# Patient Record
Sex: FEMALE | NOT HISPANIC OR LATINO | URBAN - METROPOLITAN AREA
[De-identification: names, ages, dates, MRNs, and addresses within clinical notes are randomized per-mention and may not be internally consistent; named-entity substitution may affect disease eponyms.]

---

## 2017-03-06 ENCOUNTER — IMPORTED ENCOUNTER (OUTPATIENT)
Dept: URBAN - METROPOLITAN AREA CLINIC 1 | Facility: CLINIC | Age: 77
End: 2017-03-06

## 2017-03-06 PROBLEM — H16.143: Noted: 2017-03-06

## 2017-03-06 PROBLEM — Z96.1: Noted: 2017-03-06

## 2017-03-06 PROBLEM — H43.813: Noted: 2017-03-06

## 2017-03-06 PROBLEM — H04.123: Noted: 2017-03-06

## 2017-03-06 PROBLEM — D31.32: Noted: 2017-03-06

## 2017-03-06 PROCEDURE — 92012 INTRM OPH EXAM EST PATIENT: CPT

## 2017-03-06 NOTE — PATIENT DISCUSSION
1.  Choroidal Nevus OS: Appears Benign and stable. Observe for changes. 2. PVD OU - stable RD precautions 3. Pseudophakia OU - (Standard OU) 4. MAIK w/ increased PEK OU- Pt remains symptomatic despite tear use: Inserted Small Parasol Plug RLL LLL today with no complications; Silicone Plug RLL and LLL:  Risks benefits and alternatives to placing plug was discussed with patient. Patient understands and would like to proceed. Consent was signed. Post op instructions were discussed/given to patient and patient was advised to call our office if any problems arose. Increase ATs to QID OU routinely. Return for an appointment in 6 months 27 with Dr. Salde Chadwick.

## 2017-07-12 PROBLEM — Z12.11 ENCOUNTER FOR COLONOSCOPY DUE TO HISTORY OF ADENOMATOUS COLONIC POLYPS: Status: ACTIVE | Noted: 2017-07-12

## 2017-07-12 PROBLEM — R10.32 LEFT LOWER QUADRANT PAIN: Status: ACTIVE | Noted: 2017-07-12

## 2017-07-12 PROBLEM — Z86.010 ENCOUNTER FOR COLONOSCOPY DUE TO HISTORY OF ADENOMATOUS COLONIC POLYPS: Status: ACTIVE | Noted: 2017-07-12

## 2017-09-08 ENCOUNTER — IMPORTED ENCOUNTER (OUTPATIENT)
Dept: URBAN - METROPOLITAN AREA CLINIC 1 | Facility: CLINIC | Age: 77
End: 2017-09-08

## 2017-09-08 PROBLEM — Z96.1: Noted: 2017-09-08

## 2017-09-08 PROBLEM — H43.813: Noted: 2017-09-08

## 2017-09-08 PROBLEM — H16.143: Noted: 2017-09-08

## 2017-09-08 PROBLEM — D31.32: Noted: 2017-09-08

## 2017-09-08 PROBLEM — H04.123: Noted: 2017-09-08

## 2017-09-08 PROCEDURE — 83861 MICROFLUID ANALY TEARS: CPT

## 2017-09-08 PROCEDURE — 92014 COMPRE OPH EXAM EST PT 1/>: CPT

## 2017-09-08 PROCEDURE — 92015 DETERMINE REFRACTIVE STATE: CPT

## 2017-09-08 NOTE — PATIENT DISCUSSION
1.  Choroidal Nevus OS: Appears Benign and stable. Observe for changes. 2. MAIK w/ improved PEK OU- (H/o Plugs LLs OU) Tear Lab Mild OU today. Cont ATs BID OU routinely. 3.  Pseudophakia OU - (Standard OU) 4. PVD OU - RD precautions. Letter to PCP Return for an appointment in 6 mo 10 dfe with Dr. Morteza Braun.

## 2017-09-25 PROBLEM — C44.629 SQUAMOUS CELL CANCER OF SKIN OF LEFT FOREARM: Status: ACTIVE | Noted: 2017-09-25

## 2018-03-16 ENCOUNTER — IMPORTED ENCOUNTER (OUTPATIENT)
Dept: URBAN - METROPOLITAN AREA CLINIC 1 | Facility: CLINIC | Age: 78
End: 2018-03-16

## 2018-03-16 PROBLEM — H04.123: Noted: 2018-03-16

## 2018-03-16 PROBLEM — D31.32: Noted: 2018-03-16

## 2018-03-16 PROBLEM — H16.143: Noted: 2018-03-16

## 2018-03-16 PROCEDURE — 92012 INTRM OPH EXAM EST PATIENT: CPT

## 2018-03-16 NOTE — PATIENT DISCUSSION
1.  Choroidal Nevus OS: Appears Benign and stable. Observe for changes. 2. MAIK w/ PEK OU- (H/o Plugs LLs OU) Switch to PF ATs QID OU Routinely (Sample of PF Refresh Optive given) Consider Restasis w/o improvement. 3.  Pseudophakia OU - (Standard OU) 4. PVD OU - RD precautions. Return for an appointment in 6 mo 30 with Dr. Isaac Nicole.

## 2018-04-17 ENCOUNTER — IMPORTED ENCOUNTER (OUTPATIENT)
Dept: URBAN - METROPOLITAN AREA CLINIC 1 | Facility: CLINIC | Age: 78
End: 2018-04-17

## 2018-04-17 PROBLEM — H11.31: Noted: 2018-04-17

## 2018-04-17 PROCEDURE — 92012 INTRM OPH EXAM EST PATIENT: CPT

## 2018-04-17 NOTE — PATIENT DISCUSSION
1.  Subconjunctival hemorrhage OD --Patient on ASA every other day. Reassurance given. Condition discussed with patient. 2.  Return for an appointment for Return as scheduled with Dr. Caridad Brown.

## 2018-11-27 ENCOUNTER — IMPORTED ENCOUNTER (OUTPATIENT)
Dept: URBAN - METROPOLITAN AREA CLINIC 1 | Facility: CLINIC | Age: 78
End: 2018-11-27

## 2018-11-27 PROBLEM — H43.813: Noted: 2018-11-27

## 2018-11-27 PROBLEM — D31.32: Noted: 2018-11-27

## 2018-11-27 PROBLEM — H16.143: Noted: 2018-11-27

## 2018-11-27 PROBLEM — H04.123: Noted: 2018-11-27

## 2018-11-27 PROBLEM — Z96.1: Noted: 2018-11-27

## 2018-11-27 PROCEDURE — 92014 COMPRE OPH EXAM EST PT 1/>: CPT

## 2018-11-27 PROCEDURE — 92015 DETERMINE REFRACTIVE STATE: CPT

## 2018-11-27 NOTE — PATIENT DISCUSSION
1.  Choroidal Nevus OS: Appears Benign and stable. Observe for changes. 2. MAIK w/ PEK OU- No improvement. The increase of artificial tears OU to QID were recommended routinely. 3.  PVD OU- Old stable 4. Pseudophakia OU- Doing well. 5. Return for an appointment for dfe in 6 months with Dr. John English.

## 2019-05-30 ENCOUNTER — IMPORTED ENCOUNTER (OUTPATIENT)
Dept: URBAN - METROPOLITAN AREA CLINIC 1 | Facility: CLINIC | Age: 79
End: 2019-05-30

## 2019-05-30 PROBLEM — H43.813: Noted: 2019-05-30

## 2019-05-30 PROBLEM — H04.123: Noted: 2019-05-30

## 2019-05-30 PROBLEM — H16.143: Noted: 2019-05-30

## 2019-05-30 PROBLEM — D31.32: Noted: 2019-05-30

## 2019-05-30 PROBLEM — Z96.1: Noted: 2019-05-30

## 2019-05-30 PROCEDURE — 99213 OFFICE O/P EST LOW 20 MIN: CPT

## 2019-05-30 NOTE — PATIENT DISCUSSION
1.  Choroidal Nevus OS: Appears Benign and stable. Observe for changes. 2. MAIK w/ PEK OU- (LL plugs in place OU) Recommend ATs QID OU routinely and AT Gel/Celluvisc QHS OU 3. Pseudophakia OU - Doing Well 4. PVD OU - RD precautions. Return for an appointment in 6 months 27 with Dr. Theresa Jo.

## 2019-07-02 ENCOUNTER — IMPORTED ENCOUNTER (OUTPATIENT)
Dept: URBAN - METROPOLITAN AREA CLINIC 1 | Facility: CLINIC | Age: 79
End: 2019-07-02

## 2019-07-02 PROBLEM — H11.31: Noted: 2019-07-02

## 2019-07-02 PROCEDURE — 92012 INTRM OPH EXAM EST PATIENT: CPT

## 2019-07-02 NOTE — PATIENT DISCUSSION
1.  Subconjunctival hemorrhage OD -- Reassurance given. Condition discussed with patient. 2.  MAIK w/ PEK OU- (LL plugs in place OU) Recommend ATs QID OU routinely and AT Gel/Celluvisc QHS OU 3. Pseudophakia OU - Doing Well  4. H/o Choroidal Nevus OS 5. H/o PVD OUReturn for an appointment for Return as scheduled 30 with Dr. Devin Awad.

## 2019-07-02 NOTE — PATIENT DISCUSSION
MAIK w/ PEK OU- (LL plugs in place OU) Recommend ATs QID OU routinely and AT Gel/Celluvisc QHS OU 3. Pseudophakia OU - Doing Well  4. H/o Choroidal Nevus OS 5.   H/o PVD OU

## 2019-11-25 ENCOUNTER — IMPORTED ENCOUNTER (OUTPATIENT)
Dept: URBAN - METROPOLITAN AREA CLINIC 1 | Facility: CLINIC | Age: 79
End: 2019-11-25

## 2019-11-25 PROBLEM — D31.32: Noted: 2019-11-25

## 2019-11-25 PROBLEM — Z96.1: Noted: 2019-11-25

## 2019-11-25 PROBLEM — H43.813: Noted: 2019-11-25

## 2019-11-25 PROBLEM — H04.123: Noted: 2019-11-25

## 2019-11-25 PROBLEM — H16.143: Noted: 2019-11-25

## 2019-11-25 PROCEDURE — 92014 COMPRE OPH EXAM EST PT 1/>: CPT

## 2019-11-25 NOTE — PATIENT DISCUSSION
1.  Choroidal Nevus OS -- Appears Benign and stable. Observe for changes. 2. MAIK w/ PEK OU- (LL plugs in place OU) Recommend ATs QID OU routinely and AT Gel/Celluvisc QHS OU. 3. PVD OU -- Old Stable. RD precautions. 4.  Pseudophakia OU -- Doing Well. 5.  H/o Subconjunctival hemorrhage OD -- resolved. Patient defers MRx today. Return for an appointment in 1 year for a 30 with Dr. Brisa Lomax.

## 2021-01-19 ENCOUNTER — IMPORTED ENCOUNTER (OUTPATIENT)
Dept: URBAN - METROPOLITAN AREA CLINIC 1 | Facility: CLINIC | Age: 81
End: 2021-01-19

## 2021-01-19 PROBLEM — D31.32: Noted: 2021-01-19

## 2021-01-19 PROBLEM — H16.143: Noted: 2021-01-19

## 2021-01-19 PROBLEM — H43.813: Noted: 2021-01-19

## 2021-01-19 PROBLEM — H04.123: Noted: 2021-01-19

## 2021-01-19 PROBLEM — Z96.1: Noted: 2021-01-19

## 2021-01-19 PROCEDURE — 92014 COMPRE OPH EXAM EST PT 1/>: CPT

## 2021-01-19 NOTE — PATIENT DISCUSSION
1.  Choroidal Nevus OS: Appears Benign and stable. Observe for changes. 2. MAIK w/ PEK OU- (LL plugs in place OU) Recommend ATs QID OU routinely. Consider Restasis if no improvement3. Pseudophakia OU - (Standard OU) 4. PVD OU - RD precautions. Patient deferred Manifest Rx today. Return for an appointment in 1 year 27 with Dr. Brisa Lomax.

## 2022-01-18 ENCOUNTER — IMPORTED ENCOUNTER (OUTPATIENT)
Dept: URBAN - METROPOLITAN AREA CLINIC 1 | Facility: CLINIC | Age: 82
End: 2022-01-18

## 2022-01-18 PROBLEM — D31.32: Noted: 2022-01-18

## 2022-01-18 PROBLEM — H16.143: Noted: 2022-01-18

## 2022-01-18 PROBLEM — H43.813: Noted: 2022-01-18

## 2022-01-18 PROBLEM — H01.001: Noted: 2022-01-18

## 2022-01-18 PROBLEM — H04.123: Noted: 2022-01-18

## 2022-01-18 PROBLEM — H01.021: Noted: 2022-01-18

## 2022-01-18 PROBLEM — Z96.1: Noted: 2022-01-18

## 2022-01-18 PROBLEM — H01.024: Noted: 2022-01-18

## 2022-01-18 PROBLEM — H01.004: Noted: 2022-01-18

## 2022-01-18 PROCEDURE — 99214 OFFICE O/P EST MOD 30 MIN: CPT

## 2022-01-18 NOTE — PATIENT DISCUSSION
1.  Choroidal Nevus OS -- Appears Benign and stable. Observe for changes. 2. MAIK w/ PEK OU (LL Plugs in place OU) -- Recommend PF ATs QID OU & Gel ATs QHS OU. *Stressed complaince with ATs. Consider Restasis without improvement. 3.  Anterior Blepharitis OU -- Daily Hot compresses and lid scrubs were recommended. 4. Pseudophakia OU (Standard OU) -- Doing well. 5.  PVD OU -- Stable. RD precautions. Patient deferred MRx at today's visit. Return for an appointment in 1 year 27 with Dr. Marc Reed.

## 2022-04-08 ASSESSMENT — VISUAL ACUITY
OS_CC: J1+
OD_SC: 20/20
OD_CC: J1
OS_CC: J1+
OS_CC: J1
OS_SC: 20/20
OD_SC: 20/20-1
OD_SC: 20/25
OS_SC: 20/20
OS_SC: 20/25
OS_SC: 20/20
OD_CC: J1
OS_SC: 20/20
OD_SC: 20/20
OS_SC: 20/20
OS_SC: 20/20
OS_SC: J3
OS_SC: 20/20-1
OD_CC: J1+
OD_SC: 20/20-1
OS_CC: J1
OS_SC: 20/15-2
OD_SC: J3
OD_SC: 20/20+2
OD_SC: 20/20
OS_CC: 20/20
OD_SC: 20/20
OD_CC: 20/20-2
OD_CC: J1
OS_SC: 20/20-2

## 2022-04-08 ASSESSMENT — KERATOMETRY
OD_K1POWER_DIOPTERS: 44.50
OS_K2POWER_DIOPTERS: 44.25
OS_AXISANGLE2_DEGREES: 102
OS_AXISANGLE_DEGREES: 012
OD_K2POWER_DIOPTERS: 44.00
OD_AXISANGLE_DEGREES: 174
OD_AXISANGLE2_DEGREES: 084
OS_K1POWER_DIOPTERS: 45.00

## 2022-04-08 ASSESSMENT — TONOMETRY
OD_IOP_MMHG: 13
OD_IOP_MMHG: 13
OS_IOP_MMHG: 12
OD_IOP_MMHG: 13
OS_IOP_MMHG: 13
OS_IOP_MMHG: 13
OS_IOP_MMHG: 11
OD_IOP_MMHG: 12
OS_IOP_MMHG: 13
OD_IOP_MMHG: 13
OS_IOP_MMHG: 12
OD_IOP_MMHG: 12
OS_IOP_MMHG: 12
OS_IOP_MMHG: 12
OD_IOP_MMHG: 12
OD_IOP_MMHG: 12
OD_IOP_MMHG: 11
OS_IOP_MMHG: 13
OD_IOP_MMHG: 11
OS_IOP_MMHG: 13

## 2022-09-14 ENCOUNTER — HOSPITAL ENCOUNTER (OUTPATIENT)
Dept: PHYSICAL THERAPY | Age: 82
Discharge: HOME | End: 2022-09-14
Payer: MEDICARE

## 2022-09-14 PROCEDURE — 97162 PT EVAL MOD COMPLEX 30 MIN: CPT

## 2022-09-14 PROCEDURE — 97140 MANUAL THERAPY 1/> REGIONS: CPT

## 2022-09-14 PROCEDURE — 97110 THERAPEUTIC EXERCISES: CPT

## 2022-09-14 NOTE — PROGRESS NOTES
PT DAILY TREATMENT NOTE     Patient Name: Yoel Juares  Date:2022  : 1940  [x]  Patient  Verified  Payor: [place] MED[place]ARE / Plan: [place] MEDICARE PART A & B / Product Type: Medicare /    In [id number]  Out time:1135  Total Treatment Time (min): 48  Visit #: 1 of 36    Medicare/BCBS Only   Total Timed Codes (min):  25 1:1 Treatment Time:  53       Treatment Area: Right shoulder pain [M25.511]  Cervicalgia [M54.2]    SUBJECTIVE  Pain Level (0-10 scale): 2/10  Any medication changes, allergies to medications, adverse drug reactions, diagnosis change, or new procedure performed?: [x] No    [] Yes (see summary sheet for update)  Subjective functional status/changes:   [] No changes reported  Patient is a pleasant Right handed 78year old female who presents with complaints of Right sided neck and shoulder pain over the past 6 weeks to 2 months without specific mechanism of injury. She admits to history of arthritis, especially in her Right thumb, and due to thumb pain with using her Right hand to open doors, she has recently been carrying groceries/objects more with her Right arm which may contribute to strain. Currently Patient notes pain with reaching and lifting, and is unable to sleep on her Right side. She also has pain turning her neck especially to the left.      OBJECTIVE    Modality rationale: decrease pain and increase tissue extensibility to improve the patients ability to reduce soreness and improve mobility with driving, lifting   Min Type Additional Details    [] Estim:  []Unatt       []IFC  []Premod                        []Other:  []w/ice   []w/heat  Position:  Location:    [] Estim: []Att    []TENS instruct  []NMES                    []Other:  []w/US   []w/ice   []w/heat  Position:  Location:    []  Traction: [] Cervical       []Lumbar                       [] Prone          []Supine                       []Intermittent   []Continuous Lbs:  [] before manual  [] after manual    [] Ultrasound: []Continuous   [] Pulsed                           []1MHz   []3MHz W/cm2:  Location:    []  Iontophoresis with dexamethasone         Location: [] Take home patch   [] In clinic   x []  Ice     []  heat  []  Ice massage  []  Laser   []  Anodyne Position:  Location:    []  Laser with stim  []  Other:  Position:  Location:    []  Vasopneumatic Device    []  Right     []  Left  Pre-treatment girth:  Post-treatment girth:  Measured at (location):  Pressure:       [] lo [] med [] hi   Temperature: [] lo [] med [] hi   [] Skin assessment post-treatment:  []intact []redness- no adverse reaction    []redness - adverse reaction:     Vasopnuematic compression justification:  Per bilateral girth measures taken and listed above the edema is considered significant and having an impact on the patient's strength and gait/posture       28 min [x]Eval                  []Re-Eval       10 min Therapeutic Exercise:  [] See flow sheet :HEP review and performance    Rationale: increase ROM and increase strength to improve the patients ability to perform ADLs    x min Therapeutic Activity:  [x]  See flow sheet :   Rationale: increase ROM, increase strength, and improve coordination  to improve the patients ability to perform reaching and lifting tasks with greater ease     x min Neuromuscular Re-education:  [x]  See flow sheet :   Rationale: increase strength, improve coordination, improve balance, and increase proprioception  to improve the patients ability to improve postural awareness and stability with functional tasks to reduce compensation and strain     15 min Manual Therapy:  supine STM to Right UT/levator scapulae/cervical paraspinals and scalenes, manual Right shoulder stretching all planes   The manual therapy interventions were performed at a separate and distinct time from the therapeutic activities interventions.   Rationale: decrease pain, increase ROM, increase tissue extensibility, and decrease trigger points to improve ease of turning head while driving     With   [] TE   [] TA   [] neuro   [] other: Patient Education: [x] Review HEP    [] Progressed/Changed HEP based on:   [] positioning   [] body mechanics   [] transfers   [] heat/ice application    [] other:      Other Objective/Functional Measures: cervical AROM: flexion 55 deg, extension 39 deg, rotation Right 45 deg (pain) Left 52 deg (pain), side bending Right 18 deg (pain) Left 10 deg (pain)  Shoulder AROM: flexion Right 115 deg Left 142 deg, abduction Right 120 deg Left 133 deg, functional internal rotation behind the back Right to L4 Left to inferior scapular angle  Shoulder strength: flexion/abduction Right 4/5 Left 5/5, internal rotation 5/5 bilaterally, external rotation Right 4-/5 Left 5/5  Tenderness to pressure in the Right UT/levator scapulae/cervical paraspinals     Fall Risk Assessment: Does the patient have a fear of falling? No If yes, what fall risk assessment was performed? Pain Level (0-10 scale) post treatment: 2/10    ASSESSMENT/Changes in Function:  see POC    Patient will continue to benefit from skilled PT services to modify and progress therapeutic interventions, address functional mobility deficits, address ROM deficits, address strength deficits, analyze and address soft tissue restrictions, analyze and cue movement patterns, analyze and modify body mechanics/ergonomics, assess and modify postural abnormalities, and instruct in home and community integration to attain remaining goals.      [x]  See Plan of Care  []  See progress note/recertification  []  See Discharge Summary         Progress towards goals / Updated goals:  See POC    PLAN  []  Upgrade activities as tolerated     [x]  Continue plan of care  []  Update interventions per flow sheet       []  Discharge due to:_  []  Other:_      Melissa Veras, PT 9/14/2022  11:47 AM    Future Appointments   Date Time Provider Graciela Lopez   9/16/2022  8:30 AM Leopoldo Richardson, PT [facility]   9/19/2022  2:00 PM Anola Norse [facility]   9/21/2022 12:15 PM Anola Norse [facility]   9/23/2022 10:45 AM Haylie Marcos PTA [facility]   9/26/2022  3:30 PM Anola Norse [facility]   9/28/2022 12:15 PM Anola Norse [facility]   9/30/2022  9:15 AM Albino Reina, PT [facility]   10/3/2022 10:00 AM Jasonss Celsoer, PT [facility]   10/5/2022 10:00 AM Jasonss Pitcher, PT [facility]   10/10/2022 10:00 AM Jasonss Pitcher, PT [facility]

## 2022-09-14 NOTE — PROGRESS NOTES
[facility] - [facility]  [address]  [phone] fax  Plan of Care/ Statement of Necessity for Physical Therapy Services    Patient name: Yamila Giron Start of Care: 2022   Referral source: Cherry Lucia MD : 1940    Medical Diagnosis: Right shoulder pain [M25.511]  Cervicalgia [M54.2]  Payor: [place] MEDICARE / Plan: [place] MEDICARE PART A & B / Product Type: Medicare /  Onset Date:2022    Treatment Diagnosis: Right sided neck and shoulder pain   Prior Hospitalization: see medical history Provider#: [id number]   Medications: Verified on Patient summary List    Comorbidities: heart disease, osteoporosis, fibromyalgia, hearing impairment, arthritis   Prior Level of Function: Functionally independent, retired, ambulates without AD, lives alone      The Plan of Care and following information is based on the information from the initial evaluation. Assessment/ key information: Patient is a pleasant Right handed 78year old female who presents with complaints of Right sided neck and shoulder pain over the past 6 weeks to 2 months without specific mechanism of injury. She admits to history of arthritis, especially in her Right thumb, and due to thumb pain with using her Right hand to open doors, she has recently been carrying groceries/objects more with her Right arm which may contribute to strain. Currently Patient notes pain with reaching and lifting, and is unable to sleep on her Right side. She also has pain turning her neck especially to the left. She received an injection to her Right shoulder two days ago and states that initially it reduced pain but has already been wearing off. At evaluation she ambulates without AD and demonstrates forward head/rounded shoulder posture.  She presents with the following objective measures:   cervical AROM: flexion 55 deg, extension 39 deg, rotation Right 45 deg (pain) Left 52 deg (pain), side bending Right 18 deg (pain) Left 10 deg (pain)  Shoulder AROM: flexion Right 115 deg Left 142 deg, abduction Right 120 deg Left 133 deg, functional internal rotation behind the back Right to L4 Left to inferior scapular angle  Shoulder strength: flexion/abduction Right 4/5 Left 5/5, internal rotation 5/5 bilaterally, external rotation Right 4-/5 Left 5/5  Tenderness to pressure in the Right UT/levator scapulae/cervical paraspinals            Fall Risk Assessment: Does the patient have a fear of falling? No If yes, what fall risk assessment was performed? Signs and symptoms consistent with mechanical neck and shoulder pain, no current radicular symptoms. Overall Patient is a good rehab candidate based on premorbid status and will benefit from skilled physical therapy in order to address the above deficits.      Evaluation Complexity History HIGH Complexity :3+ comorbidities / personal factors will impact the outcome/ POC ; Examination MEDIUM Complexity : 3 Standardized tests and measures addressing body structure, function, activity limitation and / or participation in recreation  ;Presentation LOW Complexity : Stable, uncomplicated  ;Clinical Decision Making MEDIUM Complexity : FOTO score of 26-74  Overall Complexity Rating: MEDIUM  Problem List: pain affecting function, decrease ROM, decrease strength, edema affecting function, decrease ADL/ functional abilitiies, decrease activity tolerance, decrease flexibility/ joint mobility, and decrease transfer abilities   Treatment Plan may include any combination of the following: Therapeutic exercise, Therapeutic activities, Neuromuscular re-education, Physical agent/modality, Manual therapy, Patient education, and Self Care training  Patient / Family readiness to learn indicated by: asking questions, trying to perform skills, and interest  Persons(s) to be included in education: patient (P)  Barriers to Learning/Limitations: None  Patient Goal (s): stop the pain  Patient Self Reported Health Status: good  Rehabilitation Potential: good    Short Term Goals: To be accomplished in 1 weeks:  Patient will be independent and compliant with HEP in order to progress toward long term goals. Status at last note/certification: issued and reviewed  Long Term Goals: To be accomplished in 10 treatments:  Patient will improve FOTO assessment score to 59 pts in order to indicate improved functional abilities. Status at last note/certification: 45 pts  2. Patient will improve cervical rotation AROM by at least 20 deg bilaterally and side bending by at least 10 deg bilaterally in order to improve ease of turning head while driving and performing daily tasks. Status at last note/certification: rotation Right 45 deg Left 52 deg, side bending Right 18 deg Left 10 deg  3. Patient will improve Right shoulder flexion and abduction AROM by at least 15 deg in sitting in order to improve ease of ADLs. Status at last note/certification: flexion 115 deg, abduction 120 deg  4. Patient will improve Right shoulder strength to 5/5 throughout in order to improve ease of lifting tasks. Status at last note/certification: flexion/abduction 4/5, external rotation 4-/5  5. Patient will report worst Right sided neck and shoulder pain as 5/10 or less in order to improve quality of sleep. Status at last note/certification: 10/29    Frequency / Duration: Patient to be seen 2 to 3 times per week for 24 to 36 treatments. (All LTGs as above will be assessed and updated every 10 visits or 30 days and progressed as needed)    Patient/ Caregiver education and instruction: Diagnosis, prognosis, exercises   [x]  Plan of care has been reviewed with PTA    Certification Period: 9/14/22 to 12/12/22  Román Bautista, PT 9/14/2022 1:25 PM  _____________________________________________________________________  I certify that the above Therapy Services are being furnished while the patient is under my care.  I agree with the treatment plan and certify that this therapy is necessary.     Physician's Signature:____________Date:_________TIME:________  Shadi Sexton MD  ** Signature, Date and Time must be completed for valid certification **    Please sign and return to [facility] - [facility]  [address]  [phone] fax

## 2022-09-16 ENCOUNTER — HOSPITAL ENCOUNTER (OUTPATIENT)
Dept: PHYSICAL THERAPY | Age: 82
Discharge: HOME | End: 2022-09-16
Payer: MEDICARE

## 2022-09-16 PROCEDURE — 97530 THERAPEUTIC ACTIVITIES: CPT

## 2022-09-16 PROCEDURE — 97112 NEUROMUSCULAR REEDUCATION: CPT

## 2022-09-16 PROCEDURE — 97140 MANUAL THERAPY 1/> REGIONS: CPT

## 2022-09-16 NOTE — PROGRESS NOTES
PT DAILY TREATMENT NOTE     Patient Name: Rosana Holley  Date:2022  : 1940  [x]  Patient  Verified  Payor: [place] MEDICARE / Plan: [place] MEDICARE PART A & B / Product Type: Medicare /    In time:833  Out time:919  Total Treatment Time (min): 46   Visit #: 2 of     Medicare/BCBS Only   Total Timed Codes (min):  46 1:1 Treatment Time:  41       Treatment Area: Right shoulder pain [M25.511]  Cervicalgia [M54.2]    SUBJECTIVE  Pain Level (0-10 scale): 0/10  Any medication changes, allergies to medications, adverse drug reactions, diagnosis change, or new procedure performed?: [x] No    [] Yes (see summary sheet for update)  Subjective functional status/changes:   [] No changes reported  I haven't hurt hardly at all the past couple of days.      OBJECTIVE    Modality rationale: decrease pain and increase tissue extensibility to improve the patients ability to improve cervical mobility with driving, reduce soreness after exercises    Min Type Additional Details    [] Estim:  []Unatt       []IFC  []Premod                        []Other:  []w/ice   []w/heat  Position:  Location:    [] Estim: []Att    []TENS instruct  []NMES                    []Other:  []w/US   []w/ice   []w/heat  Position:  Location:    []  Traction: [] Cervical       []Lumbar                       [] Prone          []Supine                       []Intermittent   []Continuous Lbs:  [] before manual  [] after manual    []  Ultrasound: []Continuous   [] Pulsed                           []1MHz   []3MHz W/cm2:  Location:    []  Iontophoresis with dexamethasone         Location: [] Take home patch   [] In clinic   PD []  Ice     []  heat  []  Ice massage  []  Laser   []  Anodyne Position:  Location:    []  Laser with stim  []  Other:  Position:  Location:    []  Vasopneumatic Device    []  Right     []  Left  Pre-treatment girth:  Post-treatment girth:  Measured at (location):  Pressure:       [] lo [] med [] hi   Temperature: [] lo [] med [] hi   [] Skin assessment post-treatment:  []intact []redness- no adverse reaction    []redness - adverse reaction:     Vasopnuematic compression justification:  Per bilateral girth measures taken and listed above the edema is considered significant and having an impact on the patient's strength and gait/posture      10 min Therapeutic Exercise:  [x] See flow sheet :  UT stretch  Levator scap stretch  Doorway stretch   Rationale: increase ROM and increase strength to improve the patients ability to perform ADLs     12 min Therapeutic Activity:  [x]  See flow sheet :  Rows, extensions with band  Full cans  Supine serratus punches    Rationale: increase ROM, increase strength, and improve coordination  to improve the patients ability to perform reaching and lifting tasks with greater ease     10 min Neuromuscular Re-education:  [x]  See flow sheet :  Supine scap stab 1-3  Supine cervical retractions   Rationale: increase strength, improve coordination, improve balance, and increase proprioception  to improve the patients ability to improve postural awareness and stability with functional tasks to reduce compensation and strain      14 min Manual Therapy:  supine STM to Right UT/levator scapulae/cervical paraspinals and scalenes, manual Right shoulder stretching all planes   The manual therapy interventions were performed at a separate and distinct time from the therapeutic activities interventions.   Rationale: decrease pain, increase ROM, increase tissue extensibility, and decrease trigger points to improve ease of turning head while driving       With   [] TE   [] TA   [] neuro   [] other: Patient Education: [x] Review HEP    [] Progressed/Changed HEP based on:   [] positioning   [] body mechanics   [] transfers   [] heat/ice application    [] other:      Other Objective/Functional Measures: initiated treatment per flow sheet  Tactile and verbal cueing throughout rows with band to limit shoulder hiking/Right UT hyperactivation  Good posture with full cans but with some increasing forward head at the end     Pain Level (0-10 scale) post treatment: 4/10 (sore)    ASSESSMENT/Changes in Function: Patient reports good tolerance to manual interventions at evaluation, with limited pain levels in the last couple of days. She does have some increased Right shoulder soreness with strengthening activities today, and has tenderness in the Right cervical and shoulder musculature during manual interventions. Despite soreness after exercises Patient declined modalities. Educated on delayed muscle soreness. Patient will continue to benefit from skilled PT services to modify and progress therapeutic interventions, address functional mobility deficits, address ROM deficits, address strength deficits, analyze and address soft tissue restrictions, analyze and cue movement patterns, analyze and modify body mechanics/ergonomics, assess and modify postural abnormalities, address imbalance/dizziness, and instruct in home and community integration to attain remaining goals. []  See Plan of Care  []  See progress note/recertification  []  See Discharge Summary         Progress towards goals / Updated goals:  Short Term Goals: To be accomplished in 1 weeks:  Patient will be independent and compliant with HEP in order to progress toward long term goals. Status at last note/certification: issued and reviewed  Long Term Goals: To be accomplished in 10 treatments:  Patient will improve FOTO assessment score to 59 pts in order to indicate improved functional abilities. Status at last note/certification: 45 pts  2. Patient will improve cervical rotation AROM by at least 20 deg bilaterally and side bending by at least 10 deg bilaterally in order to improve ease of turning head while driving and performing daily tasks. Status at last note/certification: rotation Right 45 deg Left 52 deg, side bending Right 18 deg Left 10 deg  3.  Patient will improve Right shoulder flexion and abduction AROM by at least 15 deg in sitting in order to improve ease of ADLs. Status at last note/certification: flexion 115 deg, abduction 120 deg  4. Patient will improve Right shoulder strength to 5/5 throughout in order to improve ease of lifting tasks. Status at last note/certification: flexion/abduction 4/5, external rotation 4-/5  5. Patient will report worst Right sided neck and shoulder pain as 5/10 or less in order to improve quality of sleep.   Status at last note/certification: 92/94    PLAN  []  Upgrade activities as tolerated     [x]  Continue plan of care  []  Update interventions per flow sheet       []  Discharge due to:_  []  Other:_      Cheri Fuentes PT 9/16/2022  8:00 AM    Future Appointments   Date Time Provider Graciela Lopez   9/16/2022  8:30 AM Savage Han, PT [facility]   9/19/2022  2:00 PM Amalia Valiente [facility]   9/21/2022 12:15 PM Savage Han, PT [facility]   9/23/2022 10:45 AM Mark Haynes PTA [facility]   9/26/2022  3:30 PM Savage Han, PT [facility]   9/28/2022 12:15 PM Savage Han, PT [facility]   9/30/2022  9:15 AM Savage Han, PT [facility]   10/3/2022 10:00 AM [facility]   10/5/2022 10:00 AM Savage Emely, PT [facility]   10/10/2022 10:00 AM Savage Han, PT [facility]

## 2022-09-19 ENCOUNTER — HOSPITAL ENCOUNTER (OUTPATIENT)
Dept: PHYSICAL THERAPY | Age: 82
Discharge: HOME | End: 2022-09-19
Payer: MEDICARE

## 2022-09-19 PROCEDURE — 97140 MANUAL THERAPY 1/> REGIONS: CPT

## 2022-09-19 PROCEDURE — 97530 THERAPEUTIC ACTIVITIES: CPT

## 2022-09-19 PROCEDURE — 97112 NEUROMUSCULAR REEDUCATION: CPT

## 2022-09-19 NOTE — PROGRESS NOTES
PT DAILY TREATMENT NOTE     Patient Name: Sindi Null  Date:2022  : 1940  [x]  Patient  Verified  Payor: [place] MEDICARE / Plan: [place] MEDICARE PART A & B / Product Type: Medicare /    In time:200  Out time:249  Total Treatment Time (min): 49  Visit #: 3 of     Medicare/BCBS Only   Total Timed Codes (min):  49 1:1 Treatment Time:  49       Treatment Area: Right shoulder pain [M25.511]  Cervicalgia [M54.2]    SUBJECTIVE  Pain Level (0-10 scale): 0/10  Any medication changes, allergies to medications, adverse drug reactions, diagnosis change, or new procedure performed?: [x] No    [] Yes (see summary sheet for update)  Subjective functional status/changes:   [] No changes reported  I'm not having any pain right now, except by my rib, I don't know what I did.      OBJECTIVE    Modality rationale: decrease pain and increase tissue extensibility to improve the patients ability to improve cervical mobility with functional tasks   Min Type Additional Details    [] Estim:  []Unatt       []IFC  []Premod                        []Other:  []w/ice   []w/heat  Position:  Location:    [] Estim: []Att    []TENS instruct  []NMES                    []Other:  []w/US   []w/ice   []w/heat  Position:  Location:    []  Traction: [] Cervical       []Lumbar                       [] Prone          []Supine                       []Intermittent   []Continuous Lbs:  [] before manual  [] after manual    []  Ultrasound: []Continuous   [] Pulsed                           []1MHz   []3MHz W/cm2:  Location:    []  Iontophoresis with dexamethasone         Location: [] Take home patch   [] In clinic   PD []  Ice     []  heat  []  Ice massage  []  Laser   []  Anodyne Position:  Location:    []  Laser with stim  []  Other:  Position:  Location:    []  Vasopneumatic Device    []  Right     []  Left  Pre-treatment girth:  Post-treatment girth:  Measured at (location):  Pressure:       [] lo [] med [] hi   Temperature: [] lo [] med [] hi   [] Skin assessment post-treatment:  []intact []redness- no adverse reaction    []redness - adverse reaction:     Vasopnuematic compression justification:  Per bilateral girth measures taken and listed above the edema is considered significant and having an impact on the patient's strength and gait/posture       10 min Therapeutic Exercise:  [x] See flow sheet :  UT stretch  Levator scap stretch  Doorway stretch   Rationale: increase ROM and increase strength to improve the patients ability to perform ADLs     13 min Therapeutic Activity:  [x]  See flow sheet :  Rows, extensions with band  Full cans  Supine serratus punches   HEP update and review   Rationale: increase ROM, increase strength, and improve coordination  to improve the patients ability to perform reaching and lifting tasks with greater ease     10 min Neuromuscular Re-education:  [x]  See flow sheet :  Supine scap stab 1-3  Supine cervical retractions   Rationale: increase strength, improve coordination, improve balance, and increase proprioception  to improve the patients ability to improve postural awareness and stability with functional tasks to reduce compensation and strain      16 min Manual Therapy:  supine STM to Right UT/levator scapulae/cervical paraspinals and scalenes, manual Right shoulder stretching all planes   The manual therapy interventions were performed at a separate and distinct time from the therapeutic activities interventions.   Rationale: decrease pain, increase ROM, increase tissue extensibility, and decrease trigger points to improve ease of turning head while driving                  With   [] TE   [] TA   [] neuro   [] other: Patient Education: [x] Review HEP    [] Progressed/Changed HEP based on:   [] positioning   [] body mechanics   [] transfers   [] heat/ice application    [] other:      Other Objective/Functional Measures: updated HEP and reviewed  Increased Right side/rib pain with supine serratus punches so held- fine after stopping exercise   Tactile and verbal cueing to reduce bilateral shoulder hiking with rows and wall push ups    Pain Level (0-10 scale) post treatment: 3/10    ASSESSMENT/Changes in Function: Patient reports no adverse effects following first treatment session, and reports overall decreased pain since initiating therapy. She does demonstrate shoulder hiking with exercises intermittently, likely related to/contributing to upper trapezius pain and hyperactivation. Palpable trigger points in the Right upper trapezius that Patient reports is her pain. Progress as able. Patient will continue to benefit from skilled PT services to modify and progress therapeutic interventions, address functional mobility deficits, address ROM deficits, address strength deficits, analyze and address soft tissue restrictions, analyze and cue movement patterns, analyze and modify body mechanics/ergonomics, assess and modify postural abnormalities, address imbalance/dizziness, and instruct in home and community integration to attain remaining goals. []  See Plan of Care  []  See progress note/recertification  []  See Discharge Summary         Progress towards goals / Updated goals:  Short Term Goals: To be accomplished in 1 weeks:  Patient will be independent and compliant with HEP in order to progress toward long term goals. Status at last note/certification: issued and reviewed  Current status: met 9/19/22  Long Term Goals: To be accomplished in 10 treatments:  Patient will improve FOTO assessment score to 59 pts in order to indicate improved functional abilities. Status at last note/certification: 45 pts  Current status:   2. Patient will improve cervical rotation AROM by at least 20 deg bilaterally and side bending by at least 10 deg bilaterally in order to improve ease of turning head while driving and performing daily tasks.    Status at last note/certification: rotation Right 45 deg Left 52 deg, side bending Right 18 deg Left 10 deg  Current status:   3. Patient will improve Right shoulder flexion and abduction AROM by at least 15 deg in sitting in order to improve ease of ADLs. Status at last note/certification: flexion 115 deg, abduction 120 deg  Current status:   4. Patient will improve Right shoulder strength to 5/5 throughout in order to improve ease of lifting tasks. Status at last note/certification: flexion/abduction 4/5, external rotation 4-/5  Current status:   5. Patient will report worst Right sided neck and shoulder pain as 5/10 or less in order to improve quality of sleep.   Status at last note/certification: 02/73  Current status: progressing, decreased pain levels since initiating therapy 9/19/22    PLAN  [x]  Upgrade activities as tolerated     [x]  Continue plan of care  []  Update interventions per flow sheet       []  Discharge due to:_  []  Other:_      Jennifer Haji, PT 9/19/2022  1:59 PM    Future Appointments   Date Time Provider Graciela Lopez   9/19/2022  2:00 PM [place] Covert, PT [facility]   9/21/2022 12:15 PM Arvind Covert, PT [facility]   9/23/2022 10:45 AM Randy Kumari, PTA [facility]   9/26/2022  3:30 PM [place] Covert, PT [facility]   9/28/2022 12:15 PM Arvind Covert, PT [facility]   9/30/2022  9:15 AM Arvind Covert, PT [facility]   10/3/2022 10:00 AM [place] Covert, PT [facility]   10/5/2022 10:00 AM Arvind Covert, PT [facility]   10/10/2022 10:00 AM [place] Covert, PT [facility]

## 2022-09-21 ENCOUNTER — HOSPITAL ENCOUNTER (OUTPATIENT)
Dept: PHYSICAL THERAPY | Age: 82
Discharge: HOME | End: 2022-09-21
Payer: MEDICARE

## 2022-09-21 PROCEDURE — 97140 MANUAL THERAPY 1/> REGIONS: CPT

## 2022-09-21 PROCEDURE — 97112 NEUROMUSCULAR REEDUCATION: CPT

## 2022-09-21 PROCEDURE — 97530 THERAPEUTIC ACTIVITIES: CPT

## 2022-09-21 NOTE — PROGRESS NOTES
PT DAILY TREATMENT NOTE     Patient Name: Beatriz Query  Date:2022  : 1940  [x]  Patient  Verified  Payor: [place] MEDICARE / Plan: [place] MEDICARE PART A & B / Product Type: Medicare /    In time:1215  Out time:107  Total Treatment Time (min): 52   Visit #: 4 of     Medicare/BCBS Only   Total Timed Codes (min):  52 1:1 Treatment Time:  42       Treatment Area: Right shoulder pain [M25.511]  Cervicalgia [M54.2]    SUBJECTIVE  Pain Level (0-10 scale): 0/10  Any medication changes, allergies to medications, adverse drug reactions, diagnosis change, or new procedure performed?: [x] No    [] Yes (see summary sheet for update)  Subjective functional status/changes:   [] No changes reported  I'm just stiff today. And I think I overdid it yesterday, i'm just not feeling very energetic.      OBJECTIVE    Modality rationale: decrease pain and increase tissue extensibility to improve the patients ability to reduce soreness after exercises    Min Type Additional Details    [] Estim:  []Unatt       []IFC  []Premod                        []Other:  []w/ice   []w/heat  Position:  Location:    [] Estim: []Att    []TENS instruct  []NMES                    []Other:  []w/US   []w/ice   []w/heat  Position:  Location:    []  Traction: [] Cervical       []Lumbar                       [] Prone          []Supine                       []Intermittent   []Continuous Lbs:  [] before manual  [] after manual    []  Ultrasound: []Continuous   [] Pulsed                           []1MHz   []3MHz W/cm2:  Location:    []  Iontophoresis with dexamethasone         Location: [] Take home patch   [] In clinic   PD []  Ice     []  heat  []  Ice massage  []  Laser   []  Anodyne Position:  Location:    []  Laser with stim  []  Other:  Position:  Location:    []  Vasopneumatic Device    []  Right     []  Left  Pre-treatment girth:  Post-treatment girth:  Measured at (location):  Pressure:       [] lo [] med [] hi   Temperature: [] lo [] med [] hi   [] Skin assessment post-treatment:  []intact []redness- no adverse reaction    []redness - adverse reaction:     Vasopnuematic compression justification:  Per bilateral girth measures taken and listed above the edema is considered significant and having an impact on the patient's strength and gait/posture       10 min Therapeutic Exercise:  [x] See flow sheet :  UT stretch  Levator scap stretch  Doorway stretch   Rationale: increase ROM and increase strength to improve the patients ability to perform ADLs     17 min Therapeutic Activity:  [x]  See flow sheet :  Rows, extensions with band  Full cans  Supine serratus punches   HEP update and review  Wall slide   Rationale: increase ROM, increase strength, and improve coordination  to improve the patients ability to perform reaching and lifting tasks with greater ease     10 min Neuromuscular Re-education:  [x]  See flow sheet :  Supine scap stab 1-3  Supine cervical retractions with assistance    Rationale: increase strength, improve coordination, improve balance, and increase proprioception  to improve the patients ability to improve postural awareness and stability with functional tasks to reduce compensation and strain      15 min Manual Therapy:  supine STM to Right UT/levator scapulae/cervical paraspinals and scalenes, manual Right shoulder stretching all planes, gentle cervical distraction    The manual therapy interventions were performed at a separate and distinct time from the therapeutic activities interventions.   Rationale: decrease pain, increase ROM, increase tissue extensibility, and decrease trigger points to improve ease of turning head while driving                    With   [] TE   [] TA   [] neuro   [] other: Patient Education: [x] Review HEP    [] Progressed/Changed HEP based on:   [] positioning   [] body mechanics   [] transfers   [] heat/ice application    [] other:      Other Objective/Functional Measures: added wall slides (no lift), with focus on scapular depression with sliding down to neutral   verbal and tactile cues to prevent shoulder hiking with wall push ups, new wall slides, and with supine serratus punches on the Right   Cervical AROM (after manual): rotation Right 54 deg Left 45 deg, side bending Right 16 deg Left 20 deg  Right shoulder AROM: flexion and abduction 145 deg, some soreness     Pain Level (0-10 scale) post treatment: 2/10    ASSESSMENT/Changes in Function: Patient reports ability to sleep on her Right side at night without increased pain for the first time in several weeks, starting last night. Today she demonstrates improving cervical and Right shoulder AROM, but reports that she does still have soreness with mobility. She continues to notice increased Right sided pain with reaching and lifting as well. Patient will continue to benefit from skilled PT services to modify and progress therapeutic interventions, address functional mobility deficits, address ROM deficits, address strength deficits, analyze and address soft tissue restrictions, analyze and cue movement patterns, analyze and modify body mechanics/ergonomics, assess and modify postural abnormalities, address imbalance/dizziness, and instruct in home and community integration to attain remaining goals. []  See Plan of Care  []  See progress note/recertification  []  See Discharge Summary         Progress towards goals / Updated goals:  Short Term Goals: To be accomplished in 1 weeks:  Patient will be independent and compliant with HEP in order to progress toward long term goals. Status at last note/certification: issued and reviewed  Current status: met 9/19/22  Long Term Goals: To be accomplished in 10 treatments:  Patient will improve FOTO assessment score to 59 pts in order to indicate improved functional abilities. Status at last note/certification: 45 pts  Current status:   2.  Patient will improve cervical rotation AROM by at least 20 deg bilaterally and side bending by at least 10 deg bilaterally in order to improve ease of turning head while driving and performing daily tasks. Status at last note/certification: rotation Right 45 deg Left 52 deg, side bending Right 18 deg Left 10 deg  Current status: progressing, rotation Right 54 deg Left 45 deg, side bending Right 16 deg Left 20 deg 9/21/22  3. Patient will improve Right shoulder flexion and abduction AROM by at least 15 deg in sitting in order to improve ease of ADLs. Status at last note/certification: flexion 115 deg, abduction 120 deg  Current status: met, flexion and abduction 145 deg, some soreness 9/21/22  4. Patient will improve Right shoulder strength to 5/5 throughout in order to improve ease of lifting tasks. Status at last note/certification: flexion/abduction 4/5, external rotation 4-/5  Current status:   5. Patient will report worst Right sided neck and shoulder pain as 5/10 or less in order to improve quality of sleep.   Status at last note/certification: 59/66  Current status: progressing, decreased pain levels since initiating therapy 9/19/22    PLAN  [x]  Upgrade activities as tolerated     [x]  Continue plan of care  []  Update interventions per flow sheet       []  Discharge due to:_  []  Other:_      Delon Conti PT 9/21/2022  10:35 AM    Future Appointments   Date Time Provider Graciela Lopez   9/21/2022 12:15 PM Pura Dailey PT [facility]   9/23/2022 10:45 AM Sunita Fox PTA [facility]   9/26/2022  3:30 PM Pura Dailey PT [facility]   9/28/2022 12:15 PM Pura Dailey PT [facility]   9/30/2022  9:15 AM Pura Dailey PT [facility]   10/3/2022 10:00 AM Pura Dailey PT [facility]   10/5/2022 10:00 AM Pura Dailey PT [facility]   10/10/2022 10:00 [facility]

## 2022-09-23 ENCOUNTER — HOSPITAL ENCOUNTER (OUTPATIENT)
Dept: PHYSICAL THERAPY | Age: 82
Discharge: HOME | End: 2022-09-23
Payer: MEDICARE

## 2022-09-23 PROCEDURE — 97112 NEUROMUSCULAR REEDUCATION: CPT

## 2022-09-23 PROCEDURE — 97140 MANUAL THERAPY 1/> REGIONS: CPT

## 2022-09-23 PROCEDURE — 97530 THERAPEUTIC ACTIVITIES: CPT

## 2022-09-23 NOTE — PROGRESS NOTES
PT DAILY TREATMENT NOTE     Patient Name: Enrrique Cr  Date:2022  : 1940  [x]  Patient  Verified  Payor: [place] MED[place]ARE / Plan: [place] MEDICARE PART A & B / Product Type: Medicare /    In time:10:50  Out time:11:44  Total Treatment Time (min): 54  Visit #: 5 of 24-36    Medicare/BCBS Only   Total Timed Codes (min):  44 1:1 Treatment Time: 39        Treatment Area: Right shoulder pain [M25.511]  Cervicalgia [M54.2]    SUBJECTIVE  Pain Level (0-10 scale): 1-2/10  Any medication changes, allergies to medications, adverse drug reactions, diagnosis change, or new procedure performed?: [x] No    [] Yes (see summary sheet for update)  Subjective functional status/changes:   [] No changes reported  I have this tightness to the (R) side of my neck and up to the back of my head to     OBJECTIVE    Modality rationale: decrease pain and increase tissue extensibility to improve the patients ability to reduce soreness after exercises    Min Type Additional Details    [] Estim:  []Unatt       []IFC  []Premod                        []Other:  []w/ice   []w/heat  Position:  Location:    [] Estim: []Att    []TENS instruct  []NMES                    []Other:  []w/US   []w/ice   []w/heat  Position:  Location:    []  Traction: [] Cervical       []Lumbar                       [] Prone          []Supine                       []Intermittent   []Continuous Lbs:  [] before manual  [] after manual    []  Ultrasound: []Continuous   [] Pulsed                           []1MHz   []3MHz W/cm2:  Location:    []  Iontophoresis with dexamethasone         Location: [] Take home patch   [] In clinic   10 []  Ice     [x]  heat  []  Ice massage  []  Laser   []  Anodyne Position: long sitting   Location: cervical spine     []  Laser with stim  []  Other:  Position:  Location:    []  Vasopneumatic Device    []  Right     []  Left  Pre-treatment girth:  Post-treatment girth:  Measured at (location):  Pressure:       [] lo [] med [] hi Temperature: [] lo [] med [] hi   [] Skin assessment post-treatment:  []intact []redness- no adverse reaction    []redness - adverse reaction:     Vasopnuematic compression justification:  Per bilateral girth measures taken and listed above the edema is considered significant and having an impact on the patient's strength and gait/posture       5 min Therapeutic Exercise:  [x] See flow sheet :  UT stretch  Levator scap stretch  Doorway stretch   Rationale: increase ROM and increase strength to improve the patients ability to perform ADLs     14 min Therapeutic Activity:  [x]  See flow sheet :  Rows, extensions with band  Full cans  Supine serratus punches   HEP update and review  Wall slide   Rationale: increase ROM, increase strength, and improve coordination  to improve the patients ability to perform reaching and lifting tasks with greater ease     10 min Neuromuscular Re-education:  [x]  See flow sheet :  Supine scap stab 1-3  Supine cervical retractions with assistance    Rationale: increase strength, improve coordination, improve balance, and increase proprioception  to improve the patients ability to improve postural awareness and stability with functional tasks to reduce compensation and strain      15 min Manual Therapy:  supine STM to Right UT/levator scapulae/cervical paraspinals and scalenes, manual Right shoulder stretching all planes, gentle cervical distraction    The manual therapy interventions were performed at a separate and distinct time from the therapeutic activities interventions.   Rationale: decrease pain, increase ROM, increase tissue extensibility, and decrease trigger points to improve ease of turning head while driving                    With   [] TE   [] TA   [] neuro   [] other: Patient Education: [x] Review HEP    [] Progressed/Changed HEP based on:   [] positioning   [] body mechanics   [] transfers   [] heat/ice application    [] other:      Other Objective/Functional Measures  LTG#  5. Patient will report worst Right sided neck and shoulder pain as 5/10 or less in order to improve quality of sleep. Status at last note/certification: 06/36  Current status: progressing arrive with pain 1-2/10 more stiffness then pain 9/23/2022    Pain Level (0-10 scale) post treatment: 1/10    ASSESSMENT/Changes in Function:   Patient experience some increase pain/discomfort to the (R) lower ribs with wall slides, wall push up and serratus punch in supine - possible due to attempting to compensate with lower trap, lats instead of using the serratus and mid trap/rhomboids due to weakness in these area. Gave tactile cuing to attempt to decrease compensate and to elicit a proper muscle contraction to the mid trap/serratus and rhomboids. Patient presented with a painful and large trigger point to the (R) UT and LS that responded well with manual     Patient will continue to benefit from skilled PT services to modify and progress therapeutic interventions, address functional mobility deficits, address ROM deficits, address strength deficits, analyze and address soft tissue restrictions, analyze and cue movement patterns, analyze and modify body mechanics/ergonomics, assess and modify postural abnormalities, address imbalance/dizziness, and instruct in home and community integration to attain remaining goals. [x]  See Plan of Care  []  See progress note/recertification  []  See Discharge Summary         Progress towards goals / Updated goals:  Short Term Goals: To be accomplished in 1 weeks:  Patient will be independent and compliant with HEP in order to progress toward long term goals. Status at last note/certification: issued and reviewed  Current status: met 9/19/22  Long Term Goals: To be accomplished in 10 treatments:  Patient will improve FOTO assessment score to 59 pts in order to indicate improved functional abilities. Status at last note/certification: 45 pts  Current status:   2. Patient will improve cervical rotation AROM by at least 20 deg bilaterally and side bending by at least 10 deg bilaterally in order to improve ease of turning head while driving and performing daily tasks. Status at last note/certification: rotation Right 45 deg Left 52 deg, side bending Right 18 deg Left 10 deg  Current status: progressing, rotation Right 54 deg Left 45 deg, side bending Right 16 deg Left 20 deg 9/21/22  3. Patient will improve Right shoulder flexion and abduction AROM by at least 15 deg in sitting in order to improve ease of ADLs. Status at last note/certification: flexion 115 deg, abduction 120 deg  Current status: met, flexion and abduction 145 deg, some soreness 9/21/22  4. Patient will improve Right shoulder strength to 5/5 throughout in order to improve ease of lifting tasks. Status at last note/certification: flexion/abduction 4/5, external rotation 4-/5  Current status:   5. Patient will report worst Right sided neck and shoulder pain as 5/10 or less in order to improve quality of sleep.   Status at last note/certification: 56/98  Current status: progressing arrive with pain 1-2/10 more stiffness then pain 9/23/2022    PLAN  [x]  Upgrade activities as tolerated     [x]  Continue plan of care  []  Update interventions per flow sheet       []  Discharge due to:_  []  Other:_      Kymberly Brandt, MARC 9/23/2022  10:35 AM    Future Appointments   Date Time Provider Graciela Lopez   9/26/2022  3:30 PM Jordyn Javier PT [facility]   9/28/2022 12:15 PM Jordyn Javier PT [facility]   9/30/2022  9:15 AM Jordyn Javier PT [facility]   10/3/2022 10:00 AM Jordyn Javier PT [facility]   10/5/2022 10:00 AM Jordyn Javier PT [facility]   10/10/2022 10:00 AM Jordyn Javier PT [facility]

## 2022-09-26 ENCOUNTER — HOSPITAL ENCOUNTER (OUTPATIENT)
Dept: PHYSICAL THERAPY | Age: 82
Discharge: HOME | End: 2022-09-26
Payer: MEDICARE

## 2022-09-26 PROCEDURE — 97140 MANUAL THERAPY 1/> REGIONS: CPT

## 2022-09-26 PROCEDURE — 97112 NEUROMUSCULAR REEDUCATION: CPT

## 2022-09-26 PROCEDURE — 97530 THERAPEUTIC ACTIVITIES: CPT

## 2022-09-26 NOTE — PROGRESS NOTES
PT DAILY TREATMENT NOTE     Patient Name: Margot Aus  Date:2022  : 1940  [x]  Patient  Verified  Payor: Everett Vallecillo / Plan: [place] MEDICARE PART A & B / Product Type: Medicare /    In time:330  Out time:419  Total Treatment Time (min): 49  Visit #: 6 of     Medicare/BCBS Only   Total Timed Codes (min):  49 1:1 Treatment Time:  49       Treatment Area: Right shoulder pain [M25.511]  Cervicalgia [M54.2]    SUBJECTIVE  Pain Level (0-10 scale): 3/10  Any medication changes, allergies to medications, adverse drug reactions, diagnosis change, or new procedure performed?: [x] No    [] Yes (see summary sheet for update)  Subjective functional status/changes:   [] No changes reported  I'm aching all over today, maybe it's the weather.      OBJECTIVE    Modality rationale: decrease pain and increase tissue extensibility to improve the patients ability to reduce soreness after exercises    Min Type Additional Details    [] Estim:  []Unatt       []IFC  []Premod                        []Other:  []w/ice   []w/heat  Position:  Location:    [] Estim: []Att    []TENS instruct  []NMES                    []Other:  []w/US   []w/ice   []w/heat  Position:  Location:    []  Traction: [] Cervical       []Lumbar                       [] Prone          []Supine                       []Intermittent   []Continuous Lbs:  [] before manual  [] after manual    []  Ultrasound: []Continuous   [] Pulsed                           []1MHz   []3MHz W/cm2:  Location:    []  Iontophoresis with dexamethasone         Location: [] Take home patch   [] In clinic   PD []  Ice     []  heat  []  Ice massage  []  Laser   []  Anodyne Position:  Location:    []  Laser with stim  []  Other:  Position:  Location:    []  Vasopneumatic Device    []  Right     []  Left  Pre-treatment girth:  Post-treatment girth:  Measured at (location):  Pressure:       [] lo [] med [] hi   Temperature: [] lo [] med [] hi   [] Skin assessment post-treatment: []intact []redness- no adverse reaction    []redness - adverse reaction:     Vasopnuematic compression justification:  Per bilateral girth measures taken and listed above the edema is considered significant and having an impact on the patient's strength and gait/posture       10 min Therapeutic Exercise:  [x] See flow sheet :  UT stretch  Levator scap stretch  Doorway stretch  Ball on wall    Rationale: increase ROM and increase strength to improve the patients ability to perform ADLs     16 min Therapeutic Activity:  [x]  See flow sheet :  Rows, extensions with band  Full cans  Supine serratus punches   Wall slide   Rationale: increase ROM, increase strength, and improve coordination  to improve the patients ability to perform reaching and lifting tasks with greater ease     10 min Neuromuscular Re-education:  [x]  See flow sheet :  Supine scap stab 1-3  Supine cervical retractions with assistance    Rationale: increase strength, improve coordination, improve balance, and increase proprioception  to improve the patients ability to improve postural awareness and stability with functional tasks to reduce compensation and strain      13 min Manual Therapy:  seated STM to Right UT/levator scapulae    The manual therapy interventions were performed at a separate and distinct time from the therapeutic activities interventions.   Rationale: decrease pain, increase ROM, increase tissue extensibility, and decrease trigger points to improve ease of turning head while driving                               With   [] TE   [] TA   [] neuro   [] other: Patient Education: [x] Review HEP    [] Progressed/Changed HEP based on:   [] positioning   [] body mechanics   [] transfers   [] heat/ice application    [] other:      Other Objective/Functional Measures: added ball circles on wall for scapular endurance/engagement- challenged with sequencing and reported increased Right thumb pain  Manual assistance for cervical retractions to ensure proper technique- good technique noted  Increased lateral rib discomfort bilaterally with supine serratus punches on the Right again likely due to weakness/compensation    Updated HEP, included orange resistance band     Pain Level (0-10 scale) post treatment: 1/10    ASSESSMENT/Changes in Function: Patient reports that she has had increased aching throughout her body since yesterday, possibly due to recent cool down in the weather. At start of visit she notes pain in both UT areas, but reports more soreness in the Right shoulder with exercises. Performed manual interventions in seated to better address scapular restrictions and tightness, with Patient reporting good reduction in discomfort following manual. Updated HEP and issued orange resistance band. Patient will continue to benefit from skilled PT services to modify and progress therapeutic interventions, address functional mobility deficits, address ROM deficits, address strength deficits, analyze and address soft tissue restrictions, analyze and cue movement patterns, analyze and modify body mechanics/ergonomics, assess and modify postural abnormalities, address imbalance/dizziness, and instruct in home and community integration to attain remaining goals. []  See Plan of Care  []  See progress note/recertification  []  See Discharge Summary         Progress towards goals / Updated goals:  Short Term Goals: To be accomplished in 1 weeks:  Patient will be independent and compliant with HEP in order to progress toward long term goals. Status at last note/certification: issued and reviewed  Current status: met 9/19/22  Long Term Goals: To be accomplished in 10 treatments:  Patient will improve FOTO assessment score to 59 pts in order to indicate improved functional abilities. Status at last note/certification: 45 pts  Current status:   2.  Patient will improve cervical rotation AROM by at least 20 deg bilaterally and side bending by at least 10 deg bilaterally in order to improve ease of turning head while driving and performing daily tasks. Status at last note/certification: rotation Right 45 deg Left 52 deg, side bending Right 18 deg Left 10 deg  Current status: progressing, rotation Right 54 deg Left 45 deg, side bending Right 16 deg Left 20 deg 9/21/22  3. Patient will improve Right shoulder flexion and abduction AROM by at least 15 deg in sitting in order to improve ease of ADLs. Status at last note/certification: flexion 115 deg, abduction 120 deg  Current status: met, flexion and abduction 145 deg, some soreness 9/21/22  4. Patient will improve Right shoulder strength to 5/5 throughout in order to improve ease of lifting tasks. Status at last note/certification: flexion/abduction 4/5, external rotation 4-/5  Current status:   5. Patient will report worst Right sided neck and shoulder pain as 5/10 or less in order to improve quality of sleep.   Status at last note/certification: 27/74  Current status: progressing arrive with pain 1-2/10 more stiffness then pain 9/23/2022    PLAN  [x]  Upgrade activities as tolerated     [x]  Continue plan of care  [x]  Update interventions per flow sheet       []  Discharge due to:_  []  Other:_      Gilmar Brooks, PT 9/26/2022  2:32 PM    Future Appointments   Date Time Provider Graciela Lopez   9/26/2022  3:30 PM Ninoskacy Neighbor [facility]   9/28/2022 12:15 PM Sandra Sales, PT [facility]   9/30/2022  9:15 AM Sandra Sales, PT [facility]   10/3/2022 10:00 AM Sandra Sales, PT [facility]   10/5/2022 10:00 AM Sandra Sales, PT [facility]   10/10/2022 10:00 AM Sandra Sales, PT [facility]

## 2022-09-28 ENCOUNTER — HOSPITAL ENCOUNTER (OUTPATIENT)
Dept: PHYSICAL THERAPY | Age: 82
Discharge: HOME | End: 2022-09-28
Payer: MEDICARE

## 2022-09-28 PROCEDURE — 97112 NEUROMUSCULAR REEDUCATION: CPT

## 2022-09-28 PROCEDURE — 97530 THERAPEUTIC ACTIVITIES: CPT

## 2022-09-28 PROCEDURE — 97110 THERAPEUTIC EXERCISES: CPT

## 2022-09-28 PROCEDURE — 97140 MANUAL THERAPY 1/> REGIONS: CPT

## 2022-09-28 NOTE — PROGRESS NOTES
PT DAILY TREATMENT NOTE     Patient Name: Royal Tobin  Date:2022  : 1940  [x]  Patient  Verified  Payor: [place] MEDICARE / Plan: [place] MEDICARE PART A & B / Product Type: Medicare /    In time:1218  Out time:114  Total Treatment Time (min): 56  Visit #: 7 of     Medicare/BCBS Only   Total Timed Codes (min):  56 1:1 Treatment Time:  53       Treatment Area: Right shoulder pain [M25.511]  Cervicalgia [M54.2]    SUBJECTIVE  Pain Level (0-10 scale): 2-3/10  Any medication changes, allergies to medications, adverse drug reactions, diagnosis change, or new procedure performed?: [x] No    [] Yes (see summary sheet for update)  Subjective functional status/changes:   [] No changes reported  I'm just achy today.      OBJECTIVE    Modality rationale: decrease pain and increase tissue extensibility to improve the patients ability to reduce soreness and stiffness with daily tasks   Min Type Additional Details    [] Estim:  []Unatt       []IFC  []Premod                        []Other:  []w/ice   []w/heat  Position:  Location:    [] Estim: []Att    []TENS instruct  []NMES                    []Other:  []w/US   []w/ice   []w/heat  Position:  Location:    []  Traction: [] Cervical       []Lumbar                       [] Prone          []Supine                       []Intermittent   []Continuous Lbs:  [] before manual  [] after manual    []  Ultrasound: []Continuous   [] Pulsed                           []1MHz   []3MHz W/cm2:  Location:    []  Iontophoresis with dexamethasone         Location: [] Take home patch   [] In clinic   PD []  Ice     []  heat  []  Ice massage  []  Laser   []  Anodyne Position:  Location:    []  Laser with stim  []  Other:  Position:  Location:    []  Vasopneumatic Device    []  Right     []  Left  Pre-treatment girth:  Post-treatment girth:  Measured at (location):  Pressure:       [] lo [] med [] hi   Temperature: [] lo [] med [] hi   [] Skin assessment post-treatment:  []intact []redness- no adverse reaction    []redness - adverse reaction:     Vasopnuematic compression justification:  Per bilateral girth measures taken and listed above the edema is considered significant and having an impact on the patient's strength and gait/posture      12 min Therapeutic Exercise:  [x] See flow sheet :  UT stretch  Levator scap stretch  Doorway stretch  Sidelying Right shoulder abduction, external rotation    Rationale: increase ROM and increase strength to improve the patients ability to perform ADLs     19 min Therapeutic Activity:  [x]  See flow sheet :  Rows, extensions with band  Full cans  Supine serratus punches   Wall slide with lift    Rationale: increase ROM, increase strength, and improve coordination  to improve the patients ability to perform reaching and lifting tasks with greater ease     10 min Neuromuscular Re-education:  [x]  See flow sheet :  seated scap stab 1-3  Supine cervical retractions with assistance    Rationale: increase strength, improve coordination, improve balance, and increase proprioception  to improve the patients ability to improve postural awareness and stability with functional tasks to reduce compensation and strain      15 min Manual Therapy:  seated STM to Right UT/levator scapulae/rhomboids   The manual therapy interventions were performed at a separate and distinct time from the therapeutic activities interventions.   Rationale: decrease pain, increase ROM, increase tissue extensibility, and decrease trigger points to improve ease of turning head while driving                                 With   [] TE   [] TA   [] neuro   [] other: Patient Education: [x] Review HEP    [] Progressed/Changed HEP based on:   [] positioning   [] body mechanics   [] transfers   [] heat/ice application    [] other:      Other Objective/Functional Measures: added sidelying Right shoulder abduction, external rotation; increased neck pain with external rotation   Progressed supine scap stabs to seated with decent posture  Seated Right shoulder flexion/abduction strength 5/5, no pain, following manual interventions     Pain Level (0-10 scale) post treatment: 0/10    ASSESSMENT/Changes in Function: Patient reports good reduction in pain following last visit, and reports no complaints with seated manual interventions compared to supine. Today she has audible popping in the Right shoulder with rows as well as full cans, and reports some discomfort with popping. Upon strength assessment, following manual interventions, she does demonstrate improved Right shoulder strength. There is a tender trigger point in the Right levator scapulae that improved with manual interventions. Patient will continue to benefit from skilled PT services to modify and progress therapeutic interventions, address functional mobility deficits, address ROM deficits, address strength deficits, analyze and address soft tissue restrictions, analyze and cue movement patterns, analyze and modify body mechanics/ergonomics, assess and modify postural abnormalities, address imbalance/dizziness, and instruct in home and community integration to attain remaining goals. []  See Plan of Care  []  See progress note/recertification  []  See Discharge Summary         Progress towards goals / Updated goals:  Short Term Goals: To be accomplished in 1 weeks:  Patient will be independent and compliant with HEP in order to progress toward long term goals. Status at last note/certification: issued and reviewed  Current status: met 9/19/22  Long Term Goals: To be accomplished in 10 treatments:  Patient will improve FOTO assessment score to 59 pts in order to indicate improved functional abilities. Status at last note/certification: 45 pts  Current status:   2.  Patient will improve cervical rotation AROM by at least 20 deg bilaterally and side bending by at least 10 deg bilaterally in order to improve ease of turning head while driving and performing daily tasks. Status at last note/certification: rotation Right 45 deg Left 52 deg, side bending Right 18 deg Left 10 deg  Current status: progressing, rotation Right 54 deg Left 45 deg, side bending Right 16 deg Left 20 deg 9/21/22  3. Patient will improve Right shoulder flexion and abduction AROM by at least 15 deg in sitting in order to improve ease of ADLs. Status at last note/certification: flexion 115 deg, abduction 120 deg  Current status: met, flexion and abduction 145 deg, some soreness 9/21/22  4. Patient will improve Right shoulder strength to 5/5 throughout in order to improve ease of lifting tasks. Status at last note/certification: flexion/abduction 4/5, external rotation 4-/5  Current status: progressing, flexion/abduction 5/5 seated following manual interventions 9/28/22  5. Patient will report worst Right sided neck and shoulder pain as 5/10 or less in order to improve quality of sleep.   Status at last note/certification: 01/03  Current status: progressing arrive with pain 1-2/10 more stiffness then pain 9/23/2022    PLAN  [x]  Upgrade activities as tolerated     [x]  Continue plan of care  []  Update interventions per flow sheet       []  Discharge due to:_  []  Other:_      Daniel Tatum PT 9/28/2022  11:58 AM    Future Appointments   Date Time Provider Graciela Lopez   9/28/2022 12:15 PM Adriana Sales PT [facility]   9/30/2022  9:15 AM Adriana Sales PT [facility]   10/3/2022 10:00 AM Adriana Sales PT [facility]   10/5/2022 10:00 AM Adriana Sales PT [facility]   10/10/2022 10:00 AM Adriana Sales PT [facility]

## 2022-09-30 ENCOUNTER — HOSPITAL ENCOUNTER (OUTPATIENT)
Dept: PHYSICAL THERAPY | Age: 82
Discharge: HOME | End: 2022-09-30
Payer: MEDICARE

## 2022-09-30 PROCEDURE — 97530 THERAPEUTIC ACTIVITIES: CPT

## 2022-09-30 PROCEDURE — 97140 MANUAL THERAPY 1/> REGIONS: CPT

## 2022-09-30 PROCEDURE — 97112 NEUROMUSCULAR REEDUCATION: CPT

## 2022-09-30 NOTE — PROGRESS NOTES
PT DAILY TREATMENT NOTE     Patient Name: Jennifer Lilly  Date:2022  : 1940  [x]  Patient  Verified  Payor: [place] MEDICARE / Plan: [place] MEDICARE PART A & B / Product Type: Medicare /    In time:915  Out time:1000  Total Treatment Time (min): 45  Visit #: 8 of     Medicare/BCBS Only   Total Timed Codes (min):  45 1:1 Treatment Time:  45       Treatment Area: Right shoulder pain [M25.511]  Cervicalgia [M54.2]    SUBJECTIVE  Pain Level (0-10 scale): 3/10  Any medication changes, allergies to medications, adverse drug reactions, diagnosis change, or new procedure performed?: [x] No    [] Yes (see summary sheet for update)  Subjective functional status/changes:   [] No changes reported  I'm not feeling too bad. I couldn't find my umbrella so I got frustrated and now I can feel it a little more.      OBJECTIVE    Modality rationale: decrease pain and increase tissue extensibility to improve the patients ability to reduce soreness after exercises, improve cervical mobility    Min Type Additional Details    [] Estim:  []Unatt       []IFC  []Premod                        []Other:  []w/ice   []w/heat  Position:  Location:    [] Estim: []Att    []TENS instruct  []NMES                    []Other:  []w/US   []w/ice   []w/heat  Position:  Location:    []  Traction: [] Cervical       []Lumbar                       [] Prone          []Supine                       []Intermittent   []Continuous Lbs:  [] before manual  [] after manual    []  Ultrasound: []Continuous   [] Pulsed                           []1MHz   []3MHz W/cm2:  Location:    []  Iontophoresis with dexamethasone         Location: [] Take home patch   [] In clinic   PD []  Ice     []  heat  []  Ice massage  []  Laser   []  Anodyne Position:  Location:    []  Laser with stim  []  Other:  Position:  Location:    []  Vasopneumatic Device    []  Right     []  Left  Pre-treatment girth:  Post-treatment girth:  Measured at (location):  Pressure:       [] lo [] med [] hi   Temperature: [] lo [] med [] hi   [] Skin assessment post-treatment:  []intact []redness- no adverse reaction    []redness - adverse reaction:     Vasopnuematic compression justification:  Per bilateral girth measures taken and listed above the edema is considered significant and having an impact on the patient's strength and gait/posture     12 min Therapeutic Exercise:  [x] See flow sheet :  UT stretch  Levator scap stretch  Doorway stretch  Sidelying Right shoulder abduction, external rotation    Rationale: increase ROM and increase strength to improve the patients ability to perform ADLs     12 min Therapeutic Activity:  [x]  See flow sheet :  Rows, extensions with band  Full cans with 1# dumbbell   Supine serratus punches   Wall slide with lift    Rationale: increase ROM, increase strength, and improve coordination  to improve the patients ability to perform reaching and lifting tasks with greater ease     10 min Neuromuscular Re-education:  [x]  See flow sheet :  seated scap stab 1-3  Supine cervical retractions with assistance    Rationale: increase strength, improve coordination, improve balance, and increase proprioception  to improve the patients ability to improve postural awareness and stability with functional tasks to reduce compensation and strain      11 min Manual Therapy:  seated STM to Right UT/levator scapulae/rhomboids   The manual therapy interventions were performed at a separate and distinct time from the therapeutic activities interventions.   Rationale: decrease pain, increase ROM, increase tissue extensibility, and decrease trigger points to improve ease of turning head while driving                                               With   [] TE   [] TA   [] neuro   [] other: Patient Education: [x] Review HEP    [] Progressed/Changed HEP based on:   [] positioning   [] body mechanics   [] transfers   [] heat/ice application    [] other:      Other Objective/Functional Measures: tactile cueing to shoulders during wall push ups to prevent shoulder hiking  Cueing throughout wall slides for sequencing, breathing (exhale with lift), and scapular depression with return to starting position      Pain Level (0-10 scale) post treatment: 1/10    ASSESSMENT/Changes in Function: Patient reports less pain with some exercises today (supine serratus punches), but does still require verbal and tactile cueing for sequencing and posture; she has a tendency to hike her shoulders, leading to strain/overactivation of upper trapezius musculature. Overall she does report less pain following therapy sessions. Patient will continue to benefit from skilled PT services to modify and progress therapeutic interventions, address functional mobility deficits, address ROM deficits, address strength deficits, analyze and address soft tissue restrictions, analyze and cue movement patterns, analyze and modify body mechanics/ergonomics, assess and modify postural abnormalities, address imbalance/dizziness, and instruct in home and community integration to attain remaining goals. []  See Plan of Care  []  See progress note/recertification  []  See Discharge Summary         Progress towards goals / Updated goals:  Short Term Goals: To be accomplished in 1 weeks:  Patient will be independent and compliant with HEP in order to progress toward long term goals. Status at last note/certification: issued and reviewed  Current status: met 9/19/22  Long Term Goals: To be accomplished in 10 treatments:  Patient will improve FOTO assessment score to 59 pts in order to indicate improved functional abilities. Status at last note/certification: 45 pts  Current status:   2. Patient will improve cervical rotation AROM by at least 20 deg bilaterally and side bending by at least 10 deg bilaterally in order to improve ease of turning head while driving and performing daily tasks.    Status at last note/certification: rotation Right 45 deg Left 52 deg, side bending Right 18 deg Left 10 deg  Current status: progressing, rotation Right 54 deg Left 45 deg, side bending Right 16 deg Left 20 deg 9/21/22  3. Patient will improve Right shoulder flexion and abduction AROM by at least 15 deg in sitting in order to improve ease of ADLs. Status at last note/certification: flexion 115 deg, abduction 120 deg  Current status: met, flexion and abduction 145 deg, some soreness 9/21/22  4. Patient will improve Right shoulder strength to 5/5 throughout in order to improve ease of lifting tasks. Status at last note/certification: flexion/abduction 4/5, external rotation 4-/5  Current status: progressing, flexion/abduction 5/5 seated following manual interventions 9/28/22  5. Patient will report worst Right sided neck and shoulder pain as 5/10 or less in order to improve quality of sleep.   Status at last note/certification: 62/85  Current status: progressing arrive with pain 1-2/10 more stiffness then pain 9/23/2022    PLAN  [x]  Upgrade activities as tolerated     [x]  Continue plan of care  []  Update interventions per flow sheet       []  Discharge due to:_  []  Other:_      Babs Soares, PT 9/30/2022  9:16 AM    Future Appointments   Date Time Provider Graciela Lopez   10/3/2022 10:00 AM Claudia Ortega PT [facility]   10/5/2022 10:00 AM Claudia Ortega PT [facility]   10/10/2022 10:00 AM Claudia Ortega PT [facility]

## 2022-10-03 ENCOUNTER — HOSPITAL ENCOUNTER (OUTPATIENT)
Dept: PHYSICAL THERAPY | Age: 82
Discharge: HOME | End: 2022-10-03
Payer: MEDICARE

## 2022-10-03 PROCEDURE — 97530 THERAPEUTIC ACTIVITIES: CPT

## 2022-10-03 PROCEDURE — 97140 MANUAL THERAPY 1/> REGIONS: CPT

## 2022-10-03 PROCEDURE — 97112 NEUROMUSCULAR REEDUCATION: CPT

## 2022-10-03 NOTE — PROGRESS NOTES
PT DAILY TREATMENT NOTE     Patient Name: Nir Rodriguez  Date:10/3/2022  : 1940  [x]  Patient  Verified  Payor: [place] MEDICARE / Plan: [place] MEDICARE PART A & B / Product Type: Medicare /    In time:1000  Out time:1047  Total Treatment Time (min): 47  Visit #: 9 of     Medicare/BCBS Only   Total Timed Codes (min):  47 1:1 Treatment Time:  47       Treatment Area: Right shoulder pain [M25.511]  Cervicalgia [M54.2]    SUBJECTIVE  Pain Level (0-10 scale): 2-3/10  Any medication changes, allergies to medications, adverse drug reactions, diagnosis change, or new procedure performed?: [x] No    [] Yes (see summary sheet for update)  Subjective functional status/changes:   [] No changes reported  It's mostly just the weather is making me achy.      OBJECTIVE    Modality rationale: decrease pain and increase tissue extensibility to improve the patients ability to reduce soreness after exercises    Min Type Additional Details    [] Estim:  []Unatt       []IFC  []Premod                        []Other:  []w/ice   []w/heat  Position:  Location:    [] Estim: []Att    []TENS instruct  []NMES                    []Other:  []w/US   []w/ice   []w/heat  Position:  Location:    []  Traction: [] Cervical       []Lumbar                       [] Prone          []Supine                       []Intermittent   []Continuous Lbs:  [] before manual  [] after manual    []  Ultrasound: []Continuous   [] Pulsed                           []1MHz   []3MHz W/cm2:  Location:    []  Iontophoresis with dexamethasone         Location: [] Take home patch   [] In clinic   PD []  Ice     []  heat  []  Ice massage  []  Laser   []  Anodyne Position:  Location:    []  Laser with stim  []  Other:  Position:  Location:    []  Vasopneumatic Device    []  Right     []  Left  Pre-treatment girth:  Post-treatment girth:  Measured at (location):  Pressure:       [] lo [] med [] hi   Temperature: [] lo [] med [] hi   [] Skin assessment post-treatment: []intact []redness- no adverse reaction    []redness - adverse reaction:     Vasopnuematic compression justification:  Per bilateral girth measures taken and listed above the edema is considered significant and having an impact on the patient's strength and gait/posture       10 min Therapeutic Exercise:  [x] See flow sheet :  UT stretch  Levator scap stretch  Doorway stretch  Sidelying Right shoulder abduction, external rotation - hold   Rationale: increase ROM and increase strength to improve the patients ability to perform ADLs     19 min Therapeutic Activity:  [x]  See flow sheet :  Rows, extensions with band  Full cans with 1# dumbbell   Supine serratus punches - hold  Wall slide with lift    Rationale: increase ROM, increase strength, and improve coordination  to improve the patients ability to perform reaching and lifting tasks with greater ease     8 min Neuromuscular Re-education:  [x]  See flow sheet :  seated scap stab 1-3  Supine cervical retractions with assistance - hold   Rationale: increase strength, improve coordination, improve balance, and increase proprioception  to improve the patients ability to improve postural awareness and stability with functional tasks to reduce compensation and strain      10 min Manual Therapy:  seated STM to Right UT/levator scapulae/rhomboids   The manual therapy interventions were performed at a separate and distinct time from the therapeutic activities interventions.   Rationale: decrease pain, increase ROM, increase tissue extensibility, and decrease trigger points to improve ease of turning head while driving                                                                                                   With   [] TE   [] TA   [] neuro   [] other: Patient Education: [x] Review HEP    [] Progressed/Changed HEP based on:   [] positioning   [] body mechanics   [] transfers   [] heat/ice application    [] other:      Other Objective/Functional Measures: cervical AROM: rotation Right 60 deg, Left 48 deg; side bending 20 deg bilaterally  Tactile cueing to prevent shoulder hiking with wall push ups    Pain Level (0-10 scale) post treatment: 0/10    ASSESSMENT/Changes in Function: Patient demonstrates improving cervical AROM, indicating less muscular restrictions. She notes overall less pain following therapy sessions, and less discomfort throughout the day. Reassessment next visit. Patient will continue to benefit from skilled PT services to modify and progress therapeutic interventions, address functional mobility deficits, address ROM deficits, address strength deficits, analyze and address soft tissue restrictions, analyze and cue movement patterns, analyze and modify body mechanics/ergonomics, assess and modify postural abnormalities, address imbalance/dizziness, and instruct in home and community integration to attain remaining goals. []  See Plan of Care  []  See progress note/recertification  []  See Discharge Summary         Progress towards goals / Updated goals:  Short Term Goals: To be accomplished in 1 weeks:  Patient will be independent and compliant with HEP in order to progress toward long term goals. Status at last note/certification: issued and reviewed  Current status: met 9/19/22  Long Term Goals: To be accomplished in 10 treatments:  Patient will improve FOTO assessment score to 59 pts in order to indicate improved functional abilities. Status at last note/certification: 45 pts  Current status:   2. Patient will improve cervical rotation AROM by at least 20 deg bilaterally and side bending by at least 10 deg bilaterally in order to improve ease of turning head while driving and performing daily tasks. Status at last note/certification: rotation Right 45 deg Left 52 deg, side bending Right 18 deg Left 10 deg  Current status: progressing, rotation Right 60 deg Left 48 deg, side bending Right 20 deg Left 20 deg 9/21/22  3.  Patient will improve Right shoulder flexion and abduction AROM by at least 15 deg in sitting in order to improve ease of ADLs. Status at last note/certification: flexion 115 deg, abduction 120 deg  Current status: met, flexion and abduction 145 deg, some soreness 9/21/22  4. Patient will improve Right shoulder strength to 5/5 throughout in order to improve ease of lifting tasks. Status at last note/certification: flexion/abduction 4/5, external rotation 4-/5  Current status: progressing, flexion/abduction 5/5 seated following manual interventions 9/28/22  5. Patient will report worst Right sided neck and shoulder pain as 5/10 or less in order to improve quality of sleep.   Status at last note/certification: 80/12  Current status: progressing arrive with pain 1-2/10 more stiffness then pain 9/23/2022    PLAN  [x]  Upgrade activities as tolerated     [x]  Continue plan of care  []  Update interventions per flow sheet       []  Discharge due to:_  []  Other:_      Guadalupe Monroe, PT 10/3/2022  8:53 AM    Future Appointments   Date Time Provider Graciela Lopez   10/3/2022 10:00 AM Pamela Wilder PT [facility]   10/5/2022 10:00 AM Pamela Wilder PT [facility]   10/10/2022 10:00 AM Pamela Wilder PT [facility]   10/18/2022 10:00 AM Keith Swift PTA [facility]   10/20/2022  1:15 PM Aurora Connor [facility]   10/24/2022 10:00 AM Pamela Wilder PT [facility]   10/27/2022 10:45 AM Keith Swift PTA [facility]

## 2022-10-05 ENCOUNTER — HOSPITAL ENCOUNTER (OUTPATIENT)
Dept: PHYSICAL THERAPY | Age: 82
Discharge: HOME | End: 2022-10-05
Payer: MEDICARE

## 2022-10-05 PROCEDURE — 97110 THERAPEUTIC EXERCISES: CPT

## 2022-10-05 PROCEDURE — 97140 MANUAL THERAPY 1/> REGIONS: CPT

## 2022-10-05 NOTE — PROGRESS NOTES
[facility]  [address]  [phone] fax    Medicare Progress Report      Patient name: Camilo Martinez Start of Care: 2022   Referral source: Mortimer Howell, MD : 1940   Medical/Treatment Diagnosis: Right shoulder pain [M25.511]  Cervicalgia [M54.2]  Payor: [place] MEDICARE / Plan: [place] MEDICARE PART A & B / Product Type: Medicare /  Onset Date:2022     Prior Hospitalization: see medical history Provider#: [id number]   Medications: Verified on Patient Summary List     Comorbidities: heart disease, osteoporosis, fibromyalgia, hearing impairment, arthritis   Prior Level of Function: Functionally independent, retired, ambulates without AD, lives alone    Visits from [facility]: 10    Missed Visits: 0    Reporting Period: 2022 to 10/10/2022    Subjective Reports: It doesn't hurt as much throughout the day. Today I'm just aching. Progress Toward Goals:   Short Term Goals: To be accomplished in 1 weeks:  Patient will be independent and compliant with HEP in order to progress toward long term goals. Status at last note/certification: issued and reviewed  Current status: met 22  Long Term Goals: To be accomplished in 10 treatments:  Patient will improve FOTO assessment score to 59 pts in order to indicate improved functional abilities. Status at last note/certification: 45 pts  Current status: progressing, 48 pts 10/5/22  2. Patient will improve cervical rotation AROM by at least 20 deg bilaterally and side bending by at least 10 deg bilaterally in order to improve ease of turning head while driving and performing daily tasks. Status at last note/certification: rotation Right 45 deg Left 52 deg, side bending Right 18 deg Left 10 deg  Current status: progressing, rotation Right 60 deg Left 48 deg, side bending Right 15 deg Left 20 deg 10/5/22  3.  Patient will improve Right shoulder flexion and abduction AROM by at least 15 deg in sitting in order to improve ease of ADLs. Status at last note/certification: flexion 115 deg, abduction 120 deg  Current status: met, flexion and abduction 145 deg, some soreness 9/21/22  4. Patient will improve Right shoulder strength to 5/5 throughout in order to improve ease of lifting tasks. Status at last note/certification: flexion/abduction 4/5, external rotation 4-/5  Current status: progressing, flexion/abduction 5/5 seated, external rotation 4/5 10/5/22  5. Patient will report worst Right sided neck and shoulder pain as 5/10 or less in order to improve quality of sleep. Status at last note/certification: 75/79  Current status: met, 4/10 10/5/22    Key functional changes: Right flexion/abduction strength 5/5, external rotation 4/5  Cervical AROM: rotation Right 60 deg Left 48 deg, side bending Right 15 deg Left 20 deg   Functional Gains: Right shoulder strength, ROM, overall less pain during the day  Functional Deficits: Right UT pain/aching, pain with reaching, sometimes wakes due to pain when sleeping on Right side  % improvement: 60%  Pain   Average: 2-3/10       Best: 0/10     Worst: 4/10  Patient Goal: \"to get rid of the pain\"       Problems/ barriers to goal attainment: none     Assessment / Recommendations:Patient has consistently attended physical therapy over the past few weeks, addressing Right shoulder/neck pain along with strength and ROM deficits. She demonstrates improving objective measures and reports overall less severity of pain levels. Currently Patient does still lack full cervical AROM, and demonstrates limited Right shoulder external rotation strength. She would benefit from continued skilled physical therapy in order to further progress ROM and to manage pain.      Problem List: pain affecting function, decrease ROM, decrease strength, edema affecting function, impaired gait/ balance, decrease ADL/ functional abilitiies, decrease activity tolerance, decrease flexibility/ joint mobility, and decrease transfer abilities   Treatment Plan: Therapeutic exercise, Therapeutic activities, Neuromuscular re-education, Physical agent/modality, Gait/balance training, Manual therapy, Aquatic therapy, Patient education, Self Care training, Functional mobility training, Home safety training, and Stair training    Updated Goals to be accomplished in 10 treatments:  Patient will improve FOTO assessment score to 59 pts in order to indicate improved functional abilities. Status at last note/certification: 48 pts  2. Patient will improve cervical Left rotation AROM by at least 10 deg bilaterally and side bending by at least 10 deg bilaterally in order to improve ease of turning head while driving and performing daily tasks. Status at last note/certification: 48 deg  3. Patient will improve Right shoulder external rotation strength to 5/5 in order to improve ease of functional household tasks. Status at last note/certification: 4/5    Frequency / Duration: Pt to continue current treatment  2-3 times a week for the remainder of the 24-36 visits.  Pt to be re-evaluated after the next 10 visits or 30 days which ever comes first.     Pratibha Shah, PT 10/5/2022 10:17 AM

## 2022-10-05 NOTE — PROGRESS NOTES
PT DAILY TREATMENT NOTE     Patient Name: Camilo Deal  Date:10/5/2022  : 1940  [x]  Patient  Verified  Payor: [place] MED[place]ARE / Plan: [place] MEDICARE PART A & B / Product Type: Medicare /    In time:1010  Out time:1057  Total Treatment Time (min): 47  Visit #: 10 of     Medicare/BCBS Only   Total Timed Codes (min):  37 1:1 Treatment Time:  37       Treatment Area: Right shoulder pain [M25.511]  Cervicalgia [M54.2]    SUBJECTIVE  Pain Level (0-10 scale): 410  Any medication changes, allergies to medications, adverse drug reactions, diagnosis change, or new procedure performed?: [x] No    [] Yes (see summary sheet for update)  Subjective functional status/changes:   [] No changes reported  I'm aching today.      OBJECTIVE    Modality rationale: decrease pain and increase tissue extensibility to improve the patients ability to reduce soreness after exercises    Min Type Additional Details    [] Estim:  []Unatt       []IFC  []Premod                        []Other:  []w/ice   []w/heat  Position:  Location:    [] Estim: []Att    []TENS instruct  []NMES                    []Other:  []w/US   []w/ice   []w/heat  Position:  Location:    []  Traction: [] Cervical       []Lumbar                       [] Prone          []Supine                       []Intermittent   []Continuous Lbs:  [] before manual  [] after manual    []  Ultrasound: []Continuous   [] Pulsed                           []1MHz   []3MHz W/cm2:  Location:    []  Iontophoresis with dexamethasone         Location: [] Take home patch   [] In clinic   10 []  Ice     [x]  heat  []  Ice massage  []  Laser   []  Anodyne Position:seated  Location:Right shoulder    []  Laser with stim  []  Other:  Position:  Location:    []  Vasopneumatic Device    []  Right     []  Left  Pre-treatment girth:  Post-treatment girth:  Measured at (location):  Pressure:       [] lo [] med [] hi   Temperature: [] lo [] med [] hi   [x] Skin assessment post-treatment: [x]intact []redness- no adverse reaction    []redness - adverse reaction:     Vasopnuematic compression justification:  Per bilateral girth measures taken and listed above the edema is considered significant and having an impact on the patient's strength and gait/posture       9 min Therapeutic Exercise:  [x] See flow sheet :  Reassessment   UT stretch  Levator scap stretch  Doorway stretch- hold  Sidelying Right shoulder abduction, external rotation - hold   Rationale: increase ROM and increase strength to improve the patients ability to perform ADLs     5 min Therapeutic Activity:  [x]  See flow sheet :  Rows, extensions with band  Wall push up  Full cans with 1# dumbbell - hold  Supine serratus punches - hold  Wall slide with lift - hold   Rationale: increase ROM, increase strength, and improve coordination  to improve the patients ability to perform reaching and lifting tasks with greater ease     8 min Neuromuscular Re-education:  [x]  See flow sheet :  seated scap stab 1-3  Supine cervical retractions with assistance - hold   Rationale: increase strength, improve coordination, improve balance, and increase proprioception  to improve the patients ability to improve postural awareness and stability with functional tasks to reduce compensation and strain      15 min Manual Therapy:  seated STM to Right UT/levator scapulae/rhomboids   The manual therapy interventions were performed at a separate and distinct time from the therapeutic activities interventions.   Rationale: decrease pain, increase ROM, increase tissue extensibility, and decrease trigger points to improve ease of turning head while driving                                                                              With   [] TE   [] TA   [] neuro   [] other: Patient Education: [x] Review HEP    [] Progressed/Changed HEP based on:   [] positioning   [] body mechanics   [] transfers   [] heat/ice application    [] other:      Other Objective/Functional Measures: Right flexion/abduction strength 5/5, external rotation 4/5  Cervical AROM: rotation Right 60 deg Left 48 deg, side bending Right 15 deg Left 20 deg   Functional Gains: Right shoulder strength, ROM, overall less pain during the day  Functional Deficits: Right UT pain/aching, pain with reaching, sometimes wakes due to pain when sleeping on Right side  % improvement: 60%  Pain   Average: 2-3/10       Best: 0/10     Worst: 4/10  Patient Goal: \"to get rid of the pain\"     Pain Level (0-10 scale) post treatment: 0/10    ASSESSMENT/Changes in Function: Patient has consistently attended physical therapy over the past few weeks, addressing Right shoulder/neck pain along with strength and ROM deficits. She demonstrates improving objective measures and reports overall less severity of pain levels. Currently Patient does still lack full cervical AROM, and demonstrates limited Right shoulder external rotation strength. She would benefit from continued skilled physical therapy in order to further progress ROM and to manage pain. Patient will continue to benefit from skilled PT services to modify and progress therapeutic interventions, address functional mobility deficits, address ROM deficits, address strength deficits, analyze and address soft tissue restrictions, analyze and cue movement patterns, analyze and modify body mechanics/ergonomics, assess and modify postural abnormalities, address imbalance/dizziness, and instruct in home and community integration to attain remaining goals. []  See Plan of Care  []  See progress note/recertification  []  See Discharge Summary         Progress towards goals / Updated goals:  Short Term Goals: To be accomplished in 1 weeks:  Patient will be independent and compliant with HEP in order to progress toward long term goals. Status at last note/certification: issued and reviewed  Current status: met 9/19/22  Long Term Goals:  To be accomplished in 10 treatments:  Patient will improve FOTO assessment score to 59 pts in order to indicate improved functional abilities. Status at last note/certification: 45 pts  Current status: progressing, 48 pts 10/5/22  2. Patient will improve cervical rotation AROM by at least 20 deg bilaterally and side bending by at least 10 deg bilaterally in order to improve ease of turning head while driving and performing daily tasks. Status at last note/certification: rotation Right 45 deg Left 52 deg, side bending Right 18 deg Left 10 deg  Current status: progressing, rotation Right 60 deg Left 48 deg, side bending Right 15 deg Left 20 deg 10/5/22  3. Patient will improve Right shoulder flexion and abduction AROM by at least 15 deg in sitting in order to improve ease of ADLs. Status at last note/certification: flexion 115 deg, abduction 120 deg  Current status: met, flexion and abduction 145 deg, some soreness 9/21/22  4. Patient will improve Right shoulder strength to 5/5 throughout in order to improve ease of lifting tasks. Status at last note/certification: flexion/abduction 4/5, external rotation 4-/5  Current status: progressing, flexion/abduction 5/5 seated, external rotation 4/5 10/5/22  5. Patient will report worst Right sided neck and shoulder pain as 5/10 or less in order to improve quality of sleep.   Status at last note/certification: 65/42  Current status: met, 4/10 10/5/22    PLAN  [x]  Upgrade activities as tolerated     [x]  Continue plan of care  []  Update interventions per flow sheet       []  Discharge due to:_  []  Other:_      Doyle Landau, PT 10/5/2022  9:54 AM    Future Appointments   Date Time Provider Graciela Lopez   10/5/2022 10:00 AM Patel Méndez, PT [facility]   10/10/2022 10:00 AM Patel Méndez PT [facility]   10/18/2022 10:00 AM Linde Olmsted [facility]   10/20/2022  1:15 PM Linde Olmsted [facility]   10/24/2022 10:00 AM Patel Méndez PT [facility]   10/27/2022 10:45 DAVID Manjarrez, PTA [facility]

## 2022-10-10 ENCOUNTER — HOSPITAL ENCOUNTER (OUTPATIENT)
Dept: PHYSICAL THERAPY | Age: 82
Discharge: HOME | End: 2022-10-10
Payer: MEDICARE

## 2022-10-10 PROCEDURE — 97140 MANUAL THERAPY 1/> REGIONS: CPT

## 2022-10-10 PROCEDURE — 97530 THERAPEUTIC ACTIVITIES: CPT

## 2022-10-10 PROCEDURE — 97112 NEUROMUSCULAR REEDUCATION: CPT

## 2022-10-10 NOTE — PROGRESS NOTES
PT DAILY TREATMENT NOTE     Patient Name: Radha Grenola  Date:10/10/2022  : 1940  [x]  Patient  Verified  Payor: [place] MEDICARE / Plan: [place] MEDICARE PART A & B / Product Type: Medicare /    In time:959  Out time:1059  Total Treatment Time (min): 60  Visit #: 11 of 36    Medicare/BCBS Only   Total Timed Codes (min):  50 1:1 Treatment Time:  50       Treatment Area: Right shoulder pain [M25.511]  Cervicalgia [M54.2]    SUBJECTIVE  Pain Level (0-10 scale): 2-3/10  Any medication changes, allergies to medications, adverse drug reactions, diagnosis change, or new procedure performed?: [x] No    [] Yes (see summary sheet for update)  Subjective functional status/changes:   [] No changes reported  I got a flu shot Friday and I've been hurting all over.      OBJECTIVE    Modality rationale: decrease pain and increase tissue extensibility to improve the patients ability to reduce soreness with cervical mobility    Min Type Additional Details    [] Estim:  []Unatt       []IFC  []Premod                        []Other:  []w/ice   []w/heat  Position:  Location:    [] Estim: []Att    []TENS instruct  []NMES                    []Other:  []w/US   []w/ice   []w/heat  Position:  Location:    []  Traction: [] Cervical       []Lumbar                       [] Prone          []Supine                       []Intermittent   []Continuous Lbs:  [] before manual  [] after manual    []  Ultrasound: []Continuous   [] Pulsed                           []1MHz   []3MHz W/cm2:  Location:    []  Iontophoresis with dexamethasone         Location: [] Take home patch   [] In clinic   10 []  Ice     [x]  heat  []  Ice massage  []  Laser   []  Anodyne Position:seated  Location:Right neck/shoulder     []  Laser with stim  []  Other:  Position:  Location:    []  Vasopneumatic Device    []  Right     []  Left  Pre-treatment girth:  Post-treatment girth:  Measured at (location):  Pressure:       [] lo [] med [] hi   Temperature: [] lo [] med [] hi   [x] Skin assessment post-treatment:  [x]intact []redness- no adverse reaction    []redness - adverse reaction:     Vasopnuematic compression justification:  Per bilateral girth measures taken and listed above the edema is considered significant and having an impact on the patient's strength and gait/posture       5 min Therapeutic Exercise:  [x] See flow sheet :  UT stretch  Levator scap stretch  Doorway stretch  Sidelying Right shoulder abduction, external rotation - hold   Rationale: increase ROM and increase strength to improve the patients ability to perform ADLs     20 min Therapeutic Activity:  [x]  See flow sheet :  Rows, extensions with band  Wall push up  Full cans with 1# dumbbell   Supine serratus punches - hold  Wall slide with lift    Rationale: increase ROM, increase strength, and improve coordination  to improve the patients ability to perform reaching and lifting tasks with greater ease     10 min Neuromuscular Re-education:  [x]  See flow sheet :  seated scap stab 1-3  seated cervical retractions with assistance   Wall clocks    Rationale: increase strength, improve coordination, improve balance, and increase proprioception  to improve the patients ability to improve postural awareness and stability with functional tasks to reduce compensation and strain      15 min Manual Therapy:  seated STM to Right UT/levator scapulae/rhomboids/cervical paraspinals    The manual therapy interventions were performed at a separate and distinct time from the therapeutic activities interventions.   Rationale: decrease pain, increase ROM, increase tissue extensibility, and decrease trigger points to improve ease of turning head while driving                            With   [] TE   [] TA   [] neuro   [] other: Patient Education: [x] Review HEP    [] Progressed/Changed HEP based on:   [] positioning   [] body mechanics   [] transfers   [] heat/ice application    [] other:      Other Objective/Functional Measures: wall clocks with YTB introduced  Increased UT strain with seated band diagonals   Performed cervical retractions in sitting with cueing to prevent cervical extension      Pain Level (0-10 scale) post treatment: 2/10    ASSESSMENT/Changes in Function: Patient reports increased pain overall since last week when she received her flu shot in her Left arm. She reports discomfort with sleep, leading to increased tension especially in the Right upper trapezius. Today she has more soreness into the neck, proximal to the UT. Reviewed HEP as Patient will not be in later this week. Resume two times per week next week. Patient will continue to benefit from skilled PT services to modify and progress therapeutic interventions, address functional mobility deficits, address ROM deficits, address strength deficits, analyze and address soft tissue restrictions, analyze and cue movement patterns, analyze and modify body mechanics/ergonomics, assess and modify postural abnormalities, address imbalance/dizziness, and instruct in home and community integration to attain remaining goals. []  See Plan of Care  []  See progress note/recertification  []  See Discharge Summary         Progress towards goals / Updated goals:  Patient will improve FOTO assessment score to 59 pts in order to indicate improved functional abilities. Status at last note/certification: 48 pts  Current status:   2. Patient will improve cervical Left rotation AROM by at least 10 deg bilaterally and side bending by at least 10 deg bilaterally in order to improve ease of turning head while driving and performing daily tasks. Status at last note/certification: 48 deg  Current status:   3. Patient will improve Right shoulder external rotation strength to 5/5 in order to improve ease of functional household tasks.    Status at last note/certification: 4/5  Current status:     PLAN  [x]  Upgrade activities as tolerated     [x]  Continue plan of care  [] Update interventions per flow sheet       []  Discharge due to:_  []  Other:_      Terrance Stephen PT 10/10/2022  9:55 AM    Future Appointments   Date Time Provider Graciela Lopez   10/10/2022 10:00 AM Colton Davis PT [facility]   10/18/2022 10:00 AM Rondel Lofts [facility]   10/20/2022  1:15 PM Rondel Lofts [facility]   10/24/2022 10:00 AM Colton Davis PT [facility]   10/27/2022 10:45 AM Ryanne Mercado PTA [facility]

## 2022-10-11 ENCOUNTER — EMERGENCY VISIT (OUTPATIENT)
Dept: URBAN - METROPOLITAN AREA CLINIC 1 | Facility: CLINIC | Age: 82
End: 2022-10-11

## 2022-10-11 DIAGNOSIS — H04.123: ICD-10-CM

## 2022-10-11 DIAGNOSIS — H16.143: ICD-10-CM

## 2022-10-11 PROCEDURE — 92012 INTRM OPH EXAM EST PATIENT: CPT

## 2022-10-11 ASSESSMENT — TONOMETRY
OS_IOP_MMHG: 13
OD_IOP_MMHG: 12

## 2022-10-11 ASSESSMENT — VISUAL ACUITY
OS_CC: 20/20
OD_CC: 20/20

## 2022-10-11 NOTE — PATIENT DISCUSSION
Recommend increasing ATs QID OU, routinely. If no improvement when seen by [facility] Consider Xi[facility]ra or [facility]asis.

## 2022-10-18 ENCOUNTER — HOSPITAL ENCOUNTER (OUTPATIENT)
Dept: PHYSICAL THERAPY | Age: 82
Discharge: HOME | End: 2022-10-18
Payer: MEDICARE

## 2022-10-18 PROCEDURE — 97140 MANUAL THERAPY 1/> REGIONS: CPT

## 2022-10-18 PROCEDURE — 97112 NEUROMUSCULAR REEDUCATION: CPT

## 2022-10-18 PROCEDURE — 97530 THERAPEUTIC ACTIVITIES: CPT

## 2022-10-18 NOTE — PROGRESS NOTES
PT DAILY TREATMENT NOTE     Patient Name: Urban Lee  Date:10/18/2022  : 1940  [x]  Patient  Verified  Payor: [employer] / Plan: [place] MEDICARE PART A & B / Product Type: Medicare /    In time 10:05  Out time:10:55  Total Treatment Time (min): 50  Visit #: 12 of     Medicare/BCBS Only   Total Timed Codes (min):  45 1:1 Treatment Time:  40       Treatment Area: Right shoulder pain [M25.511]  Cervicalgia [M54.2]    SUBJECTIVE  Pain Level (0-10 scale): 4/10  Any medication changes, allergies to medications, adverse drug reactions, diagnosis change, or new procedure performed?: [x] No    [] Yes (see summary sheet for update)  Subjective functional status/changes:   [] No changes reported     I think that flu shot messed me up, I went to the doctor's on 10/12/2022 and he wants me to continue and to add something to my therapy he wrote it on the new order that I brought in today   OBJECTIVE    Modality rationale: decrease pain and increase tissue extensibility to improve the patients ability to reduce soreness with cervical mobility    Min Type Additional Details    [] Estim:  []Unatt       []IFC  []Premod                        []Other:  []w/ice   []w/heat  Position:  Location:    [] Estim: []Att    []TENS instruct  []NMES                    []Other:  []w/US   []w/ice   []w/heat  Position:  Location:    []  Traction: [] Cervical       []Lumbar                       [] Prone          []Supine                       []Intermittent   []Continuous Lbs:  [] before manual  [] after manual    []  Ultrasound: []Continuous   [] Pulsed                           []1MHz   []3MHz W/cm2:  Location:    []  Iontophoresis with dexamethasone         Location: [] Take home patch   [] In clinic   5 []  Ice     [x]  heat  []  Ice massage  []  Laser   []  Anodyne Position:seated  Location:Right neck/shoulder     []  Laser with stim  []  Other:  Position:  Location:    []  Vasopneumatic Device    []  Right     [] Left  Pre-treatment girth:  Post-treatment girth:  Measured at (location):  Pressure:       [] lo [] med [] hi   Temperature: [] lo [] med [] hi   [x] Skin assessment post-treatment:  [x]intact []redness- no adverse reaction    []redness - adverse reaction:     Vasopnuematic compression justification:  Per bilateral girth measures taken and listed above the edema is considered significant and having an impact on the patient's strength and gait/posture       5 min Therapeutic Exercise:  [x] See flow sheet :  UT stretch  Levator scap stretch  Doorway stretch  Sidelying Right shoulder abduction, external rotation - hold   Rationale: increase ROM and increase strength to improve the patients ability to perform ADLs     15 min Therapeutic Activity:  [x]  See flow sheet :  Rows, extensions with band  Wall push up  Full cans with 1# dumbbell   Supine serratus punches - hold  Wall slide with lift    Rationale: increase ROM, increase strength, and improve coordination  to improve the patients ability to perform reaching and lifting tasks with greater ease     10 min Neuromuscular Re-education:  [x]  See flow sheet :  seated scap stab 1-3  seated cervical retractions with assistance   Wall clocks    Rationale: increase strength, improve coordination, improve balance, and increase proprioception  to improve the patients ability to improve postural awareness and stability with functional tasks to reduce compensation and strain      15 min Manual Therapy:  seated STM to Right UT/levator scapulae/rhomboids/cervical paraspinals    The manual therapy interventions were performed at a separate and distinct time from the therapeutic activities interventions.   Rationale: decrease pain, increase ROM, increase tissue extensibility, and decrease trigger points to improve ease of turning head while driving                            With   [] TE   [] TA   [] neuro   [] other: Patient Education: [x] Review HEP    [] Progressed/Changed HEP based on:   [] positioning   [] body mechanics   [] transfers   [] heat/ice application    [] other:      Other Objective/Functional Measures:  MD orders stated to continue with PT and add scapular strengthening - explained to patient that her current program already includes scapular strengthening     GOALS  3. Patient will improve Right shoulder external rotation strength to 5/5 in order to improve ease of functional household tasks. Status at last note/certification: 4/5  Current status: 4/5 10/18/2022     Pain Level (0-10 scale) post treatment: 2/10    ASSESSMENT/Changes in Function:   Patient continues to require verbal and tactile cuing to perform each exercise and activities correctly, patient presents with some memory deficit that effects patient's carryover from treatment to treatment. Patient is challenged with wall clocks and is only able to perform 3 reps prior to fatigue. Continues to present with painful trigger points to the levator scap and medial border of scap    Patient will continue to benefit from skilled PT services to modify and progress therapeutic interventions, address functional mobility deficits, address ROM deficits, address strength deficits, analyze and address soft tissue restrictions, analyze and cue movement patterns, analyze and modify body mechanics/ergonomics, assess and modify postural abnormalities, address imbalance/dizziness, and instruct in home and community integration to attain remaining goals. [x]  See Plan of Care  []  See progress note/recertification  []  See Discharge Summary         Progress towards goals / Updated goals:  Patient will improve FOTO assessment score to 59 pts in order to indicate improved functional abilities. Status at last note/certification: 48 pts  Current status:   2.  Patient will improve cervical Left rotation AROM by at least 10 deg bilaterally and side bending by at least 10 deg bilaterally in order to improve ease of turning head while driving and performing daily tasks. Status at last note/certification: 48 deg  Current status:   3. Patient will improve Right shoulder external rotation strength to 5/5 in order to improve ease of functional household tasks.    Status at last note/certification: 4/5  Current status: 4/5 10/18/2022    PLAN  [x]  Upgrade activities as tolerated     [x]  Continue plan of care  []  Update interventions per flow sheet       []  Discharge due to:_  []  Other:_      Verba Abler, PTA 10/18/2022  9:55 AM    Future Appointments   Date Time Provider Graciela Lopez   10/20/2022  1:15 PM Zoe Hansenons [facility]   10/24/2022 10:00 AM Carmell Seed, PT [facility]   10/27/2022 10:45 AM Filemon Up, PTA [facility]   10/31/2022 10:00 AM Carmell Seed, PT [facility]   11/2/2022 10:00 AM Carmell Seed, PT [facility]   11/7/2022 10:00 AM Carmell Seed, PT [facility]   11/9/2022 10:00 AM Carmell Seed, PT [facility]   11/14/2022 10:00 AM Carmell Seed, PT [facility]   11/16/2022 10:00 AM Carmell Seed, PT [facility]   11/21/2022 10:00 AM Carmell Seed, PT [facility]

## 2022-10-20 ENCOUNTER — HOSPITAL ENCOUNTER (OUTPATIENT)
Dept: PHYSICAL THERAPY | Age: 82
Discharge: HOME | End: 2022-10-20
Payer: MEDICARE

## 2022-10-20 PROCEDURE — 97112 NEUROMUSCULAR REEDUCATION: CPT

## 2022-10-20 PROCEDURE — 97530 THERAPEUTIC ACTIVITIES: CPT

## 2022-10-20 PROCEDURE — 97140 MANUAL THERAPY 1/> REGIONS: CPT

## 2022-10-20 NOTE — PROGRESS NOTES
PT DAILY TREATMENT NOTE     Patient Name: Lokesh Narvaez  Date:10/20/2022  : 1940  [x]  Patient  Verified  Payor: [place] MED[place] / Plan: [place] MEDICARE PART A & B / Product Type: Medicare /    In time 1:15  Out time:2:17  Total Treatment Time (min): 62  Visit #: 13 of 36    Medicare/BCBS Only   Total Timed Codes (min):  54 1:1 Treatment Time:  49       Treatment Area: Right shoulder pain [M25.511]  Cervicalgia [M54.2]    SUBJECTIVE  Pain Level (0-10 scale): 2/10  Any medication changes, allergies to medications, adverse drug reactions, diagnosis change, or new procedure performed?: [x] No    [] Yes (see summary sheet for update)  Subjective functional status/changes:   [] No changes reported   I feel better than the last time I was here.     OBJECTIVE    Modality rationale: decrease pain and increase tissue extensibility to improve the patients ability to reduce soreness with cervical mobility    Min Type Additional Details    [] Estim:  []Unatt       []IFC  []Premod                        []Other:  []w/ice   []w/heat  Position:  Location:    [] Estim: []Att    []TENS instruct  []NMES                    []Other:  []w/US   []w/ice   []w/heat  Position:  Location:    []  Traction: [] Cervical       []Lumbar                       [] Prone          []Supine                       []Intermittent   []Continuous Lbs:  [] before manual  [] after manual    []  Ultrasound: []Continuous   [] Pulsed                           []1MHz   []3MHz W/cm2:  Location:    []  Iontophoresis with dexamethasone         Location: [] Take home patch   [] In clinic   8 []  Ice     [x]  heat  []  Ice massage  []  Laser   []  Anodyne Position:seated  Location:Right neck/shoulder     []  Laser with stim  []  Other:  Position:  Location:    []  Vasopneumatic Device    []  Right     []  Left  Pre-treatment girth:  Post-treatment girth:  Measured at (location):  Pressure:       [] lo [] med [] hi   Temperature: [] lo [] med [] hi   [x] Skin assessment post-treatment:  [x]intact []redness- no adverse reaction    []redness - adverse reaction:     Vasopnuematic compression justification:  Per bilateral girth measures taken and listed above the edema is considered significant and having an impact on the patient's strength and gait/posture       14 min Therapeutic Exercise:  [x] See flow sheet :  UT stretch  Levator scap stretch  Doorway stretch  UBE   Rationale: increase ROM and increase strength to improve the patients ability to perform ADLs     15 min Therapeutic Activity:  [x]  See flow sheet :  Rows, extensions with band  Wall push up  Full cans with 1# dumbbell 2 sets  Supine serratus punches - hold  Wall slide with lift    Rationale: increase ROM, increase strength, and improve coordination  to improve the patients ability to perform reaching and lifting tasks with greater ease     10 min Neuromuscular Re-education:  [x]  See flow sheet :  seated scap stab 1-3  seated cervical retractions with assistance   Wall clocks    Rationale: increase strength, improve coordination, improve balance, and increase proprioception  to improve the patients ability to improve postural awareness and stability with functional tasks to reduce compensation and strain      15 min Manual Therapy:  seated STM to Right UT/levator scapulae/rhomboids/cervical paraspinals    The manual therapy interventions were performed at a separate and distinct time from the therapeutic activities interventions. Rationale: decrease pain, increase ROM, increase tissue extensibility, and decrease trigger points to improve ease of turning head while driving                            With   [] TE   [] TA   [] neuro   [] other: Patient Education: [x] Review HEP    [] Progressed/Changed HEP based on:   [] positioning   [] body mechanics   [] transfers   [] heat/ice application    [] other:      Other Objective/Functional Measures:   Added UBE forward and backwards   (L) AROM- cervical rotation 55 degrees        GOALS  2. Patient will improve cervical Left rotation AROM by at least 10 deg bilaterally and side bending by at least 10 deg bilaterally in order to improve ease of turning head while driving and performing daily tasks. Status at last note/certification: 48 deg  Current status:  progressing 55 degrees with active cervical rotation to the (L) 10/20/2022     Pain Level (0-10 scale) post treatment: 0/10    ASSESSMENT/Changes in Function:   Patient continues to present with a painful trigger point to the upper trap/mid trap to the medial border of scap with decrease tightness to the levator scap. Patient is progressing well with increase tolerance and less fatigue with exercise/activities performed except for wall clocks continues to be a challenge and is only able to tolerate 3 reps prior to fatigue   Patient will continue to benefit from skilled PT services to modify and progress therapeutic interventions, address functional mobility deficits, address ROM deficits, address strength deficits, analyze and address soft tissue restrictions, analyze and cue movement patterns, analyze and modify body mechanics/ergonomics, assess and modify postural abnormalities, address imbalance/dizziness, and instruct in home and community integration to attain remaining goals. [x]  See Plan of Care  []  See progress note/recertification  []  See Discharge Summary         Progress towards goals / Updated goals:  Patient will improve FOTO assessment score to 59 pts in order to indicate improved functional abilities. Status at last note/certification: 48 pts  Current status:  2. Patient will improve cervical Left rotation AROM by at least 10 deg bilaterally and side bending by at least 10 deg bilaterally in order to improve ease of turning head while driving and performing daily tasks.    Status at last note/certification: 48 deg  Current status:  progressing 55 degrees with active cervical rotation to the (L) 10/20/2022   3. Patient will improve Right shoulder external rotation strength to 5/5 in order to improve ease of functional household tasks.    Status at last note/certification: 4/5  Current status: 4/5 10/18/2022    PLAN  [x]  Upgrade activities as tolerated     [x]  Continue plan of care  []  Update interventions per flow sheet       []  Discharge due to:_  []  Other:_      Debbie Brown, PTA 10/20/2022  9:55 AM    Future Appointments   Date Time Provider Graciela Lopez   10/24/2022 10:00 AM Mahogany Carbon, PT [facility]   10/27/2022 10:45 AM Theresa Womack, PTA [facility]   10/31/2022 10:00 AM Mahogany Carbon, PT [facility]   11/2/2022 10:00 AM Mahogany Carbon, PT [facility]   11/7/2022 10:00 AM Mahogany Carbon, PT [facility]   11/9/2022 10:00 AM Mahogany Carbon, PT [facility]   11/14/2022 10:00 AM Mahogany Carbon, PT [facility]   11/16/2022 10:00 AM Mahogany Carbon, PT [facility]   11/21/2022 10:00 AM Mahogany Carbon, PT [facility]

## 2022-10-24 ENCOUNTER — HOSPITAL ENCOUNTER (OUTPATIENT)
Dept: PHYSICAL THERAPY | Age: 82
Discharge: HOME | End: 2022-10-24
Payer: MEDICARE

## 2022-10-24 PROCEDURE — 97140 MANUAL THERAPY 1/> REGIONS: CPT

## 2022-10-24 PROCEDURE — 97530 THERAPEUTIC ACTIVITIES: CPT

## 2022-10-24 PROCEDURE — 97112 NEUROMUSCULAR REEDUCATION: CPT

## 2022-10-24 NOTE — PROGRESS NOTES
PT DAILY TREATMENT NOTE     Patient Name: Negin Vanegas  Date:10/24/2022  : 1940  [x]  Patient  Verified  Payor: [place] MEDICARE / Plan: [place] MEDICARE PART A & B / Product Type: Medicare /    In time:1001  Out time:1057  Total Treatment Time (min): 56   Visit #: 14 of     Medicare/BCBS Only   Total Timed Codes (min):  46 1:1 Treatment Time:  41       Treatment Area: Right shoulder pain [M25.511]  Cervicalgia [M54.2]    SUBJECTIVE  Pain Level (0-10 scale): 1/10  Any medication changes, allergies to medications, adverse drug reactions, diagnosis change, or new procedure performed?: [x] No    [] Yes (see summary sheet for update)  Subjective functional status/changes:   [] No changes reported  I'm feeling pretty good.      OBJECTIVE    Modality rationale: decrease pain and increase tissue extensibility to improve the patients ability to reduce soreness following exercises    Min Type Additional Details    [] Estim:  []Unatt       []IFC  []Premod                        []Other:  []w/ice   []w/heat  Position:  Location:    [] Estim: []Att    []TENS instruct  []NMES                    []Other:  []w/US   []w/ice   []w/heat  Position:  Location:    []  Traction: [] Cervical       []Lumbar                       [] Prone          []Supine                       []Intermittent   []Continuous Lbs:  [] before manual  [] after manual    []  Ultrasound: []Continuous   [] Pulsed                           []1MHz   []3MHz W/cm2:  Location:    []  Iontophoresis with dexamethasone         Location: [] Take home patch   [] In clinic   10 []  Ice     [x]  heat  []  Ice massage  []  Laser   []  Anodyne Position:seated  Location:Right shoulder    []  Laser with stim  []  Other:  Position:  Location:    []  Vasopneumatic Device    []  Right     []  Left  Pre-treatment girth:  Post-treatment girth:  Measured at (location):  Pressure:       [] lo [] med [] hi   Temperature: [] lo [] med [] hi   [x] Skin assessment post-treatment:  [x]intact []redness- no adverse reaction    []redness - adverse reaction:     Vasopnuematic compression justification:  Per bilateral girth measures taken and listed above the edema is considered significant and having an impact on the patient's strength and gait/posture       11 min Therapeutic Exercise:  [x] See flow sheet :  UT stretch  Levator scap stretch  Doorway stretch  UBE   Rationale: increase ROM and increase strength to improve the patients ability to perform ADLs     15 min Therapeutic Activity:  [x]  See flow sheet :  Rows, extensions with band  Wall push up  Full cans with 1# dumbbell 2 sets  Supine serratus punches - hold  Wall slide with lift   Standing shoulder flexion with abduction with band    Rationale: increase ROM, increase strength, and improve coordination  to improve the patients ability to perform reaching and lifting tasks with greater ease     10 min Neuromuscular Re-education:  [x]  See flow sheet :  seated scap stab 1-3  seated cervical retractions with assistance   Wall clocks    Rationale: increase strength, improve coordination, improve balance, and increase proprioception  to improve the patients ability to improve postural awareness and stability with functional tasks to reduce compensation and strain      10 min Manual Therapy:  seated STM to Right UT/levator scapulae/rhomboids/cervical paraspinals    The manual therapy interventions were performed at a separate and distinct time from the therapeutic activities interventions.   Rationale: decrease pain, increase ROM, increase tissue extensibility, and decrease trigger points to improve ease of turning head while driving                            With   [] TE   [] TA   [] neuro   [] other: Patient Education: [x] Review HEP    [] Progressed/Changed HEP based on:   [] positioning   [] body mechanics   [] transfers   [] heat/ice application    [] other:      Other Objective/Functional Measures: added standing shoulder flexion with push into YTB for abduction      Pain Level (0-10 scale) post treatment: 1/10    ASSESSMENT/Changes in Function: Patient continues to report increased Right sided neck and shoulder pain with activities such as prolonged driving and lifting. She has been able to progress strengthening activities in therapy, but does typically report increased soreness following exercises that then decreases with manual interventions. Patient continues to require cueing with most activities for proper sequencing and positioning and to reduce compensation. Patient will continue to benefit from skilled PT services to modify and progress therapeutic interventions, address functional mobility deficits, address ROM deficits, address strength deficits, analyze and address soft tissue restrictions, analyze and cue movement patterns, analyze and modify body mechanics/ergonomics, assess and modify postural abnormalities, and instruct in home and community integration to attain remaining goals. []  See Plan of Care  []  See progress note/recertification  []  See Discharge Summary         Progress towards goals / Updated goals:  Patient will improve FOTO assessment score to 59 pts in order to indicate improved functional abilities. Status at last note/certification: 48 pts  Current status:  2. Patient will improve cervical Left rotation AROM by at least 10 deg bilaterally and side bending by at least 10 deg bilaterally in order to improve ease of turning head while driving and performing daily tasks. Status at last note/certification: 48 deg  Current status:  progressing 55 degrees with active cervical rotation to the (L) 10/20/2022   3. Patient will improve Right shoulder external rotation strength to 5/5 in order to improve ease of functional household tasks.    Status at last note/certification: 4/5  Current status: 4/5 10/18/2022    PLAN  [x]  Upgrade activities as tolerated     [x]  Continue plan of care  [x] Update interventions per flow sheet       []  Discharge due to:_  []  Other:_      Kamilah Borja, PT 10/24/2022  9:33 AM    Future Appointments   Date Time Provider Graciela Lopez   10/24/2022 10:00 AM Isa Naidu, PT [facility]   10/27/2022 10:45 AM Rita Byers, PTA [facility]   10/31/2022 10:00 AM Isa Naidu, PT [facility]   11/2/2022 10:00 AM Isa Naidu, PT [facility]   11/7/2022 10:00 AM Isa Naidu, PT [facility]   11/9/2022 10:00 AM Isa Naidu, PT [facility]   11/14/2022 10:00 AM Isa Naidu, PT [facility]   11/16/2022 10:00 AM sIa Naidu, PT [facility]   11/21/2022 10:00 AM Isa Naidu, PT [facility]

## 2022-10-27 ENCOUNTER — HOSPITAL ENCOUNTER (OUTPATIENT)
Dept: PHYSICAL THERAPY | Age: 82
Discharge: HOME | End: 2022-10-27
Payer: MEDICARE

## 2022-10-27 PROCEDURE — 97112 NEUROMUSCULAR REEDUCATION: CPT

## 2022-10-27 PROCEDURE — 97140 MANUAL THERAPY 1/> REGIONS: CPT

## 2022-10-27 PROCEDURE — 97530 THERAPEUTIC ACTIVITIES: CPT

## 2022-10-27 NOTE — PROGRESS NOTES
PT DAILY TREATMENT NOTE     Patient Name: Camilo Deal  Date:10/27/2022  : 1940  [x]  Patient  Verified  Payor: Bishop Khanna / Plan: [place] MEDICARE PART A & B / Product Type: Medicare /    In time:  Out time:11:40  Total Treatment Time (min):55    Visit #: 15 of     Medicare/BCBS Only   Total Timed Codes (min):  47 1:1 Treatment Time:  42       Treatment Area: Right shoulder pain [M25.511]  Cervicalgia [M54.2]    SUBJECTIVE  Pain Level (0-10 scale): 2/10 stiff  Any medication changes, allergies to medications, adverse drug reactions, diagnosis change, or new procedure performed?: [x] No    [] Yes (see summary sheet for update)  Subjective functional status/changes:   [] No changes reported   I am a little bit more sore today because I had to take my cat to the vet and carrying her in the cart was a bit challenging     OBJECTIVE    Modality rationale: decrease pain and increase tissue extensibility to improve the patients ability to reduce soreness following exercises    Min Type Additional Details    [] Estim:  []Unatt       []IFC  []Premod                        []Other:  []w/ice   []w/heat  Position:  Location:    [] Estim: []Att    []TENS instruct  []NMES                    []Other:  []w/US   []w/ice   []w/heat  Position:  Location:    []  Traction: [] Cervical       []Lumbar                       [] Prone          []Supine                       []Intermittent   []Continuous Lbs:  [] before manual  [] after manual    []  Ultrasound: []Continuous   [] Pulsed                           []1MHz   []3MHz W/cm2:  Location:    []  Iontophoresis with dexamethasone         Location: [] Take home patch   [] In clinic   8 []  Ice     [x]  heat  []  Ice massage  []  Laser   []  Anodyne Position:seated  Location:Right shoulder    []  Laser with stim  []  Other:  Position:  Location:    []  Vasopneumatic Device    []  Right     []  Left  Pre-treatment girth:  Post-treatment girth:  Measured at (location):  Pressure:       [] lo [] med [] hi   Temperature: [] lo [] med [] hi   [x] Skin assessment post-treatment:  [x]intact []redness- no adverse reaction    []redness - adverse reaction:     Vasopnuematic compression justification:  Per bilateral girth measures taken and listed above the edema is considered significant and having an impact on the patient's strength and gait/posture       11 min Therapeutic Exercise:  [x] See flow sheet :  UT stretch  Levator scap stretch  Doorway stretch  UBE   Rationale: increase ROM and increase strength to improve the patients ability to perform ADLs     16 min Therapeutic Activity:  [x]  See flow sheet :  Rows, extensions with band  Wall push up  Full cans with 1# dumbbell 2 sets  Supine serratus punches - in standing against wall  Wall slide with lift   Standing shoulder flexion with abduction with band    Rationale: increase ROM, increase strength, and improve coordination  to improve the patients ability to perform reaching and lifting tasks with greater ease     10 min Neuromuscular Re-education:  [x]  See flow sheet :  seated scap stab 1-3  seated cervical retractions with assistance   Wall clocks    Rationale: increase strength, improve coordination, improve balance, and increase proprioception  to improve the patients ability to improve postural awareness and stability with functional tasks to reduce compensation and strain      10 min Manual Therapy:  seated STM to Right UT/levator scapulae/rhomboids/cervical paraspinals    The manual therapy interventions were performed at a separate and distinct time from the therapeutic activities interventions.   Rationale: decrease pain, increase ROM, increase tissue extensibility, and decrease trigger points to improve ease of turning head while driving                            With   [] TE   [] TA   [] neuro   [] other: Patient Education: [x] Review HEP    [] Progressed/Changed HEP based on:   [] positioning   [] body mechanics   [] transfers   [] heat/ice application    [] other:      Other Objective/Functional Measures:       Changed Supine serratus punches - in standing against wall    GOALS  3. Patient will improve Right shoulder external rotation strength to 5/5 in order to improve ease of functional household tasks. Status at last note/certification: 4/5  Current status: 4+/5 10/27/2022    Pain Level (0-10 scale) post treatment: 1/10    ASSESSMENT/Changes in Function:   Patient continues to present with painful trigger points to upper and mid trap that is relieved with manual. Patient reported noted increase in strength with being able to open back door with key. Patient reported prior to coming to PT that she was unable to perform due to weakness in the (R) UE/hand. Continue with POC to progress towards goals stated below     Patient will continue to benefit from skilled PT services to modify and progress therapeutic interventions, address functional mobility deficits, address ROM deficits, address strength deficits, analyze and address soft tissue restrictions, analyze and cue movement patterns, analyze and modify body mechanics/ergonomics, assess and modify postural abnormalities, and instruct in home and community integration to attain remaining goals. []  See Plan of Care  [x]  See progress note/recertification  []  See Discharge Summary         Progress towards goals / Updated goals:  Patient will improve FOTO assessment score to 59 pts in order to indicate improved functional abilities. Status at last note/certification: 48 pts  Current status:  2. Patient will improve cervical Left rotation AROM by at least 10 deg bilaterally and side bending by at least 10 deg bilaterally in order to improve ease of turning head while driving and performing daily tasks. Status at last note/certification: 48 deg  Current status:  progressing 55 degrees with active cervical rotation to the (L) 10/20/2022   3.  Patient will improve Right shoulder external rotation strength to 5/5 in order to improve ease of functional household tasks.    Status at last note/certification: 4/5  Current status: 4+/5 10/27/2022    PLAN  [x]  Upgrade activities as tolerated     [x]  Continue plan of care  [x]  Update interventions per flow sheet       []  Discharge due to:_  []  Other:_      Climmie Car, PTA 10/27/2022  9:33 AM    Future Appointments   Date Time Provider Graciela Lopez   10/31/2022 10:00 AM Chinyere Estrada, PT [facility]   11/2/2022 10:00 AM Chinyere Estrada, PT [facility]   11/7/2022 10:00 AM Chinyerekendal Estrada, PT [facility]   11/9/2022 10:00 AM Chinyerekendal Estrada, PT [facility]   11/14/2022 10:00 AM Chinyerekendal Estrada, PT [facility]   11/16/2022 10:00 AM Chinyerekendal Estrada, PT [facility]   11/21/2022 10:00 AM Chinyerekendal Estrada, PT [facility]

## 2022-10-31 ENCOUNTER — HOSPITAL ENCOUNTER (OUTPATIENT)
Dept: PHYSICAL THERAPY | Age: 82
Discharge: HOME | End: 2022-10-31
Payer: MEDICARE

## 2022-10-31 PROCEDURE — 97140 MANUAL THERAPY 1/> REGIONS: CPT

## 2022-10-31 PROCEDURE — 97112 NEUROMUSCULAR REEDUCATION: CPT

## 2022-10-31 PROCEDURE — 97530 THERAPEUTIC ACTIVITIES: CPT

## 2022-10-31 NOTE — PROGRESS NOTES
PT DAILY TREATMENT NOTE     Patient Name: Vitaly Gongora  Date:10/31/2022  : 1940  [x]  Patient  Verified  Payor: [place] MEDICARE / Plan: [place] MEDICARE PART A & B / Product Type: Medicare /    In time:1000  Out time:1100  Total Treatment Time (min): 60   Visit #: 16 of     Medicare/BCBS Only   Total Timed Codes (min):  50 1:1 Treatment Time:  40       Treatment Area: Right shoulder pain [M25.511]  Cervicalgia [M54.2]    SUBJECTIVE  Pain Level (0-10 scale): 3/10  Any medication changes, allergies to medications, adverse drug reactions, diagnosis change, or new procedure performed?: [x] No    [] Yes (see summary sheet for update)  Subjective functional status/changes:   [] No changes reported  I'm  hurting today. I didn't sleep good.      OBJECTIVE    Modality rationale: decrease pain and increase tissue extensibility to improve the patients ability to reduce soreness after exercises and improve neck mobility    Min Type Additional Details    [] Estim:  []Unatt       []IFC  []Premod                        []Other:  []w/ice   []w/heat  Position:  Location:    [] Estim: []Att    []TENS instruct  []NMES                    []Other:  []w/US   []w/ice   []w/heat  Position:  Location:    []  Traction: [] Cervical       []Lumbar                       [] Prone          []Supine                       []Intermittent   []Continuous Lbs:  [] before manual  [] after manual    []  Ultrasound: []Continuous   [] Pulsed                           []1MHz   []3MHz W/cm2:  Location:    []  Iontophoresis with dexamethasone         Location: [] Take home patch   [] In clinic   10 []  Ice     [x]  heat  []  Ice massage  []  Laser   []  Anodyne Position:seated  Location:Right  neck/shoulder    []  Laser with stim  []  Other:  Position:  Location:    []  Vasopneumatic Device    []  Right     []  Left  Pre-treatment girth:  Post-treatment girth:  Measured at (location):  Pressure:       [] lo [] med [] hi   Temperature: [] lo [] med [] hi   [] Skin assessment post-treatment:  []intact []redness- no adverse reaction    []redness - adverse reaction:     Vasopnuematic compression justification:  Per bilateral girth measures taken and listed above the edema is considered significant and having an impact on the patient's strength and gait/posture       15 min Therapeutic Exercise:  [x] See flow sheet :  UT stretch  Levator scap stretch  Doorway stretch  UBE   Rationale: increase ROM and increase strength to improve the patients ability to perform ADLs     13 min Therapeutic Activity:  [x]  See flow sheet :  Rows, extensions with band  Wall push up  Full cans with 1# dumbbell 2 sets  Supine serratus punches - in standing against wall  Wall slide with lift   Standing shoulder flexion with abduction with band    Rationale: increase ROM, increase strength, and improve coordination  to improve the patients ability to perform reaching and lifting tasks with greater ease     10 min Neuromuscular Re-education:  [x]  See flow sheet :  seated scap stab 1-3  seated cervical retractions with assistance   Wall clocks    Rationale: increase strength, improve coordination, improve balance, and increase proprioception  to improve the patients ability to improve postural awareness and stability with functional tasks to reduce compensation and strain      12 min Manual Therapy:  seated STM to Right UT/levator scapulae/rhomboids/cervical paraspinals    The manual therapy interventions were performed at a separate and distinct time from the therapeutic activities interventions.   Rationale: decrease pain, increase ROM, increase tissue extensibility, and decrease trigger points to improve ease of turning head while driving                            With   [] TE   [] TA   [] neuro   [] other: Patient Education: [x] Review HEP    [] Progressed/Changed HEP based on:   [] positioning   [] body mechanics   [] transfers   [] heat/ice application    [] other:      Other Objective/Functional Measures: Left cervical rotation AROM 52 deg   Tactile cueing for cervical retractions and standing serratus punches; Patient does still report some trunk discomfort with standing serratus punches    Pain Level (0-10 scale) post treatment: 1/10    ASSESSMENT/Changes in Function: Patient reports increased Right neck/shoulder pain today, especially in the levator scapulae insertion region. She notes that she woke up with increased pain, without specific aggravating factor. Patient does report reduced pain following  therapy sessions, but continues to have impaired cervical mobility. Patient continues to require cueing for posture during exercises, but demonstrates improving activity tolerance overall. Progress as able. Patient will continue to benefit from skilled PT services to modify and progress therapeutic interventions, address functional mobility deficits, address ROM deficits, address strength deficits, analyze and address soft tissue restrictions, analyze and cue movement patterns, analyze and modify body mechanics/ergonomics, assess and modify postural abnormalities, address imbalance/dizziness, and instruct in home and community integration to attain remaining goals. []  See Plan of Care  []  See progress note/recertification  []  See Discharge Summary         Progress towards goals / Updated goals:  Patient will improve FOTO assessment score to 59 pts in order to indicate improved functional abilities. Status at last note/certification: 48 pts  Current status:  2. Patient will improve cervical Left rotation AROM by at least 10 deg bilaterally and side bending by at least 10 deg bilaterally in order to improve ease of turning head while driving and performing daily tasks. Status at last note/certification: 48 deg  Current status:  progressing 55 degrees with active cervical rotation to the (L) 10/20/2022; 52 deg following manual interventions 10/31/22  3.  Patient will improve Right shoulder external rotation strength to 5/5 in order to improve ease of functional household tasks.    Status at last note/certification: 4/5  Current status: 4+/5 10/27/2022    PLAN  [x]  Upgrade activities as tolerated     [x]  Continue plan of care  []  Update interventions per flow sheet       []  Discharge due to:_  []  Other:_      Francis Trevizo, PT 10/31/2022  9:33 AM    Future Appointments   Date Time Provider Graciela Lopez   10/31/2022 10:00 AM Felix Johnson, PT [place] [id number] [address]   11/2/2022 10:00 AM Felix Johnson, PT [place] [address] [address]   11/7/2022 10:00 AM Felix Johnson [place] [address] [address]   11/9/2022 10:00 AM Felix Johnson, [place] [address] [address]   11/14/2022 10:00 AM Felix Johnson [place] [address] [address]   11/16/2022 10:00 AM Felix Johnson, [facility] [address]   11/21/2022 10:00 AM eFlix Johnson, [facility] [address]

## 2022-11-02 ENCOUNTER — HOSPITAL ENCOUNTER (OUTPATIENT)
Dept: PHYSICAL THERAPY | Age: 82
Discharge: HOME | End: 2022-11-02
Payer: MEDICARE

## 2022-11-02 PROCEDURE — 97140 MANUAL THERAPY 1/> REGIONS: CPT

## 2022-11-02 PROCEDURE — 97530 THERAPEUTIC ACTIVITIES: CPT

## 2022-11-02 PROCEDURE — 97112 NEUROMUSCULAR REEDUCATION: CPT

## 2022-11-02 NOTE — PROGRESS NOTES
PT DAILY TREATMENT NOTE     Patient Name: Gara Severs  Date:2022  : 1940  [x]  Patient  Verified  Payor: [place] MEDICARE / Plan: [place] MEDICARE PART A & B / Product Type: Medicare /    In time:1006  Out time:1059  Total Treatment Time (min): 53  Visit #: 17 of     Medicare/BCBS Only   Total Timed Codes (min):  43 1:1 Treatment Time:  43       Treatment Area: Right shoulder pain [M25.511]  Cervicalgia [M54.2]    SUBJECTIVE  Pain Level (0-10 scale): 3/10  Any medication changes, allergies to medications, adverse drug reactions, diagnosis change, or new procedure performed?: [x] No    [] Yes (see summary sheet for update)  Subjective functional status/changes:   [] No changes reported  I'm having some pain. I don't know why, I don't know if it's the weather.      OBJECTIVE    Modality rationale: decrease pain and increase tissue extensibility to improve the patients ability to reduce soreness with moving head    Min Type Additional Details    [] Estim:  []Unatt       []IFC  []Premod                        []Other:  []w/ice   []w/heat  Position:  Location:    [] Estim: []Att    []TENS instruct  []NMES                    []Other:  []w/US   []w/ice   []w/heat  Position:  Location:    []  Traction: [] Cervical       []Lumbar                       [] Prone          []Supine                       []Intermittent   []Continuous Lbs:  [] before manual  [] after manual    []  Ultrasound: []Continuous   [] Pulsed                           []1MHz   []3MHz W/cm2:  Location:    []  Iontophoresis with dexamethasone         Location: [] Take home patch   [] In clinic   10 []  Ice     [x]  heat  []  Ice massage  []  Laser   []  Anodyne Position:seated  Location:Right neck/shoulder     []  Laser with stim  []  Other:  Position:  Location:    []  Vasopneumatic Device    []  Right     []  Left  Pre-treatment girth:  Post-treatment girth:  Measured at (location):  Pressure:       [] lo [] med [] hi   Temperature: [] lo [] med [] hi   [] Skin assessment post-treatment:  []intact []redness- no adverse reaction    []redness - adverse reaction:     Vasopnuematic compression justification:  Per bilateral girth measures taken and listed above the edema is considered significant and having an impact on the patient's strength and gait/posture       8 min Therapeutic Exercise:  [x] See flow sheet :  Doorway stretch  UBE- hold (in use)  Standing arm circles   Rationale: increase ROM and increase strength to improve the patients ability to perform ADLs     11 min Therapeutic Activity:  [x]  See flow sheet :  Rows, extensions with band  Wall push up +  Full cans with 2# dumbbell   Supine serratus punches - in standing against wall  Wall slide with lift   Standing shoulder flexion with abduction with band    Rationale: increase ROM, increase strength, and improve coordination  to improve the patients ability to perform reaching and lifting tasks with greater ease     10 min Neuromuscular Re-education:  [x]  See flow sheet :  seated scap stab 1-3- hold  seated cervical retractions with assistance   Wall clocks    Rationale: increase strength, improve coordination, improve balance, and increase proprioception  to improve the patients ability to improve postural awareness and stability with functional tasks to reduce compensation and strain      14 min Manual Therapy:  seated STM to Right UT/levator scapulae/rhomboids/cervical paraspinals    The manual therapy interventions were performed at a separate and distinct time from the therapeutic activities interventions.   Rationale: decrease pain, increase ROM, increase tissue extensibility, and decrease trigger points to improve ease of turning head while driving                            With   [] TE   [] TA   [] neuro   [] other: Patient Education: [x] Review HEP    [] Progressed/Changed HEP based on:   [] positioning   [] body mechanics   [] transfers   [] heat/ice application    [] other: Other Objective/Functional Measures: added standing arm circles for shoulder/rotator cuff endurance     Pain Level (0-10 scale) post treatment: 1/10    ASSESSMENT/Changes in Function: Patient continues to have tender trigger points throughout the Right scapular and cervical musculature, and requires assistance with postural control and awareness during multiple exercises during session. Improvement in pain levels following manual interventions. Patient will continue to benefit from skilled PT services to modify and progress therapeutic interventions, address functional mobility deficits, address ROM deficits, address strength deficits, analyze and address soft tissue restrictions, analyze and cue movement patterns, analyze and modify body mechanics/ergonomics, assess and modify postural abnormalities, address imbalance/dizziness, and instruct in home and community integration to attain remaining goals. []  See Plan of Care  []  See progress note/recertification  []  See Discharge Summary         Progress towards goals / Updated goals:  Patient will improve FOTO assessment score to 59 pts in order to indicate improved functional abilities. Status at last note/certification: 48 pts  Current status:  2. Patient will improve cervical Left rotation AROM by at least 10 deg bilaterally and side bending by at least 10 deg bilaterally in order to improve ease of turning head while driving and performing daily tasks. Status at last note/certification: 48 deg  Current status:  progressing 55 degrees with active cervical rotation to the (L) 10/20/2022; 52 deg following manual interventions 10/31/22  3. Patient will improve Right shoulder external rotation strength to 5/5 in order to improve ease of functional household tasks.    Status at last note/certification: 4/5  Current status: 4+/5 10/27/2022    PLAN  [x]  Upgrade activities as tolerated     [x]  Continue plan of care  []  Update interventions per flow sheet       []  Discharge due to:_  []  Other:_      Krystian Miguel, PT 11/2/2022  9:49 AM    Future Appointments   Date Time Provider Graciela Lopez   11/2/2022 10:00 AM Yessy Sue, PT [facility]   11/7/2022 10:00 AM Yessy Sue, PT [facility]   11/9/2022 10:00 AM Yessy Sue, PT [facility]   11/14/2022 10:00 AM Yessy Sue, PT [facility]   11/16/2022 10:00 AM Yessy Sue, PT [facility]   11/21/2022 10:00 AM Yessy Sue, PT [facility]

## 2022-11-07 ENCOUNTER — HOSPITAL ENCOUNTER (OUTPATIENT)
Dept: PHYSICAL THERAPY | Age: 82
Discharge: HOME | End: 2022-11-07
Payer: MEDICARE

## 2022-11-07 PROCEDURE — 97140 MANUAL THERAPY 1/> REGIONS: CPT

## 2022-11-07 PROCEDURE — 97110 THERAPEUTIC EXERCISES: CPT

## 2022-11-07 PROCEDURE — 97112 NEUROMUSCULAR REEDUCATION: CPT

## 2022-11-07 NOTE — PROGRESS NOTES
PT DAILY TREATMENT NOTE     Patient Name: Negin Vanegas  Date:2022  : 1940  [x]  Patient  Verified  Payor: [place] MEDICARE / Plan: [place] MEDICARE PART A & B / Product Type: Medicare /    In time:1002  Out time:1100  Total Treatment Time (min): 58  Visit #: 18 of -36    Medicare/BCBS Only   Total Timed Codes (min):  48 1:1 Treatment Time:  43       Treatment Area: Right shoulder pain [M25.511]  Cervicalgia [M54.2]    SUBJECTIVE  Pain Level (0-10 scale): 1/10  Any medication changes, allergies to medications, adverse drug reactions, diagnosis change, or new procedure performed?: [x] No    [] Yes (see summary sheet for update)  Subjective functional status/changes:   [] No changes reported  I still have real bad days but today is a good day. I didn't do anything but sit in [place].      OBJECTIVE    Modality rationale: decrease pain and increase tissue extensibility to improve the patients ability to improve cervical mobility    Min Type Additional Details    [] Estim:  []Unatt       []IFC  []Premod                        []Other:  []w/ice   []w/heat  Position:  Location:    [] Estim: []Att    []TENS instruct  []NMES                    []Other:  []w/US   []w/ice   []w/heat  Position:  Location:    []  Traction: [] Cervical       []Lumbar                       [] Prone          []Supine                       []Intermittent   []Continuous Lbs:  [] before manual  [] after manual    []  Ultrasound: []Continuous   [] Pulsed                           []1MHz   []3MHz W/cm2:  Location:    []  Iontophoresis with dexamethasone         Location: [] Take home patch   [] In clinic   10 []  Ice     [x]  heat  []  Ice massage  []  Laser   []  Anodyne Position:seated  Location:Right neck/shoulder    []  Laser with stim  []  Other:  Position:  Location:    []  Vasopneumatic Device    []  Right     []  Left  Pre-treatment girth:  Post-treatment girth:  Measured at (location):  Pressure:       [] lo [] med [] hi Temperature: [] lo [] med [] hi   [] Skin assessment post-treatment:  []intact []redness- no adverse reaction    []redness - adverse reaction:     Vasopnuematic compression justification:  Per bilateral girth measures taken and listed above the edema is considered significant and having an impact on the patient's strength and gait/posture       25 min Therapeutic Exercise:  [x] See flow sheet :  Doorway stretch- hold  UBE  Standing arm circles  FOTO/reassessment    Rationale: increase ROM and increase strength to improve the patients ability to perform ADLs     hold min Therapeutic Activity:  [x]  See flow sheet :  Rows, extensions with band  Wall push up +  Full cans with 2# dumbbell   Supine serratus punches - in standing against wall  Wall slide with lift   Standing shoulder flexion with abduction with band    Rationale: increase ROM, increase strength, and improve coordination  to improve the patients ability to perform reaching and lifting tasks with greater ease     10 min Neuromuscular Re-education:  [x]  See flow sheet :  Standing march with shoulder extension  High plank alternating shoulder taps     TC:  seated scap stab 1-3- hold  seated cervical retractions with assistance   Wall clocks    Rationale: increase strength, improve coordination, improve balance, and increase proprioception  to improve the patients ability to improve postural awareness and stability with functional tasks to reduce compensation and strain      13 min Manual Therapy:  seated STM to Right UT/levator scapulae/rhomboids/cervical paraspinals    The manual therapy interventions were performed at a separate and distinct time from the therapeutic activities interventions.   Rationale: decrease pain, increase ROM, increase tissue extensibility, and decrease trigger points to improve ease of turning head while driving                            With   [] TE   [] TA   [] neuro   [] other: Patient Education: [x] Review HEP    [] Progressed/Changed HEP based on:   [] positioning   [] body mechanics   [] transfers   [] heat/ice application    [] other:      Other Objective/Functional Measures: Right shoulder external rotation 4/5  Left cervical AROM: rotation 58 deg, side bending 20 deg   Functional Gains: less overall severity of pain, improved subjective mobility following manual interventions, biggest subjective improvement noted with manual interventions, improving sleep tolerance, improving ease of sleeping on Right side for short periods   Functional Deficits: cervical rotation mobility, lifting with Right UE (pain), difficulty pushing vacuum due to pain, pain in the region of the Right UT/levator scapulae, concerned with using hair dryer due to pain  % improvement: around 70%  Pain   Average: 2/10       Best: 0-1/10     Worst: 5/10  Patient Goal: \"not to hurt\"     Pain Level (0-10 scale) post treatment: 1/10    ASSESSMENT/Changes in Function: Patient reports overall improvement in Right sided neck/shoulder pain since starting PT, especially in the past month/several weeks with focus on strengthening activities and seated muscle release/manual interventions. Today Patient demonstrate improving cervical mobility, and reports overall greater ease with turning her head. She does, however, continue to have impaired range of motion, and reports frequent periods of pain throughout the day especially on the days she is not in therapy. Patient also frequently requires verbal and tactile cueing to perform exercises correctly, as she has a tendency to hike her shoulders and compensate with exercises. She would benefit from an additional month of therapy in order to progress functional abilities and manage pain.      Patient will continue to benefit from skilled PT services to modify and progress therapeutic interventions, address functional mobility deficits, address ROM deficits, address strength deficits, analyze and address soft tissue restrictions, analyze and cue movement patterns, analyze and modify body mechanics/ergonomics, assess and modify postural abnormalities, address imbalance/dizziness, and instruct in home and community integration to attain remaining goals. []  See Plan of Care  []  See progress note/recertification  []  See Discharge Summary         Progress towards goals / Updated goals:  Patient will improve FOTO assessment score to 59 pts in order to indicate improved functional abilities. Status at last note/certification: 48 pts  Current status: not met, remains 48 pts 11/7/22  2. Patient will improve cervical Left rotation AROM by at least 10 deg bilaterally and side bending by at least 10 deg bilaterally in order to improve ease of turning head while driving and performing daily tasks. Status at last note/certification: 48 deg  Current status:  progressing, Left rotation 58 deg, Left side bending 20 deg 11/7/22  3. Patient will improve Right shoulder external rotation strength to 5/5 in order to improve ease of functional household tasks.    Status at last note/certification: 4/5  Current status: not met, remains 4/5, painful 11/7/22     PLAN  [x]  Upgrade activities as tolerated     [x]  Continue plan of care  [x]  Update interventions per flow sheet       []  Discharge due to:_  []  Other:_      Caitlin Ram, PT 11/7/2022  9:53 AM    Future Appointments   Date Time Provider Graciela Lopez   11/7/2022 10:00 AM Sheldon Daly, PT [facility]   11/9/2022 10:00 AM Sheldon Daly, PT [facility]   11/14/2022 10:00 AM Sheldon Daly PT [facility]   11/16/2022 10:00 AM Sheldon Daly PT [facility]   11/21/2022 10:00 AM Sheldon Daly, PT [facility]   11/28/2022 10:00 AM Sheldon Daly, PT [facility]   12/1/2022  2:45 PM Rainer Rusk [facility]   12/5/2022 10:00 AM Sheldon Daly PT [facility]   12/8/2022 10:00 AM Stan Hobson, PT [facility]

## 2022-11-07 NOTE — PROGRESS NOTES
[facility] - [place]  [address]  [phone] fax    Medicare Progress Report      Patient name: Negin Vanegas Start of Care: 2022   Referral source: Azar Hernandez MD : 1940   Medical/Treatment Diagnosis: Right shoulder pain [M25.511]  Cervicalgia [M54.2]  Payor: [place] MEDICARE / Plan: [place] MEDICARE PART A & B / Product Type: Medicare /  Onset Date:2022      Prior Hospitalization: see medical history Provider#: [id number]   Medications: Verified on Patient Summary List      Comorbidities: heart disease, osteoporosis, fibromyalgia, hearing impairment, arthritis   Prior Level of Function: Functionally independent, retired, ambulates without AD, lives alone    Visits from [place] of Care: 18    Missed Visits: 0    Reporting Period: 10/10/22 to 22    Subjective Reports: I still have real bad days but today is a good day. I didn't do anything but sit in [place]. Progress Toward Goals:  Patient will improve FOTO assessment score to 59 pts in order to indicate improved functional abilities. Status at last note/certification: 48 pts  Current status: not met, remains 48 pts 22  2. Patient will improve cervical Left rotation AROM by at least 10 deg bilaterally and side bending by at least 10 deg bilaterally in order to improve ease of turning head while driving and performing daily tasks. Status at last note/certification: 48 deg  Current status:  progressing, Left rotation 58 deg, Left side bending 20 deg 22  3. Patient will improve Right shoulder external rotation strength to 5/5 in order to improve ease of functional household tasks.    Status at last note/certification: 4/5  Current status: not met, remains 4/5, painful 22     Key functional changes: Right shoulder external rotation 4/5  Left cervical AROM: rotation 58 deg, side bending 20 deg           Functional Gains: less overall severity of pain, improved subjective mobility following manual interventions, biggest subjective improvement noted with manual interventions, improving sleep tolerance, improving ease of sleeping on Right side for short periods   Functional Deficits: cervical rotation mobility, lifting with Right UE (pain), difficulty pushing vacuum due to pain, pain in the region of the Right UT/levator scapulae, concerned with using hair dryer due to pain  % improvement: around 70%  Pain   Average: 2/10                  Best: 0-1/10                Worst: 5/10  Patient Goal: \"not to hurt\"       Problems/ barriers to goal attainment: none     Assessment / Recommendations:Patient reports overall improvement in Right sided neck/shoulder pain since starting PT, especially in the past month/several weeks with focus on strengthening activities and seated muscle release/manual interventions. Today Patient demonstrate improving cervical mobility, and reports overall greater ease with turning her head. She does, however, continue to have impaired range of motion, and reports frequent periods of pain throughout the day especially on the days she is not in therapy. Patient also frequently requires verbal and tactile cueing to perform exercises correctly, as she has a tendency to hike her shoulders and compensate with exercises. She would benefit from an additional month of therapy in order to progress functional abilities and manage pain.      Problem List: pain affecting function, decrease ROM, decrease strength, edema affecting function, impaired gait/ balance, decrease ADL/ functional abilitiies, decrease activity tolerance, decrease flexibility/ joint mobility, and decrease transfer abilities   Treatment Plan: Therapeutic exercise, Neuromuscular reeducation, Manual therapy, Therapeutic activity, Self care/home management, Electric stim unattended , and Ultrasound    Updated Goals to be accomplished in 10 treatments:  Patient will improve FOTO assessment score to 59 pts in order to indicate improved functional abilities. Status at last note/certification: 48 pts  2. Patient will improve cervical Left rotation AROM by at least 10 deg in order to improve ease of turning head while driving and performing daily tasks. Status at last note/certification: 58 deg  3. Patient will improve Right shoulder external rotation strength to 5/5 in order to improve ease of functional household tasks. Status at last note/certification: 4/5    Frequency / Duration: Pt to continue current treatment  2-3 times a week for the remainder of the 24-36 visits.  Pt to be re-evaluated after the next 10 visits or 30 days which ever comes first.     Vivien Diaz, PT 11/7/2022 9:55 AM

## 2022-11-09 ENCOUNTER — HOSPITAL ENCOUNTER (OUTPATIENT)
Dept: PHYSICAL THERAPY | Age: 82
Discharge: HOME | End: 2022-11-09
Payer: MEDICARE

## 2022-11-09 PROCEDURE — 97140 MANUAL THERAPY 1/> REGIONS: CPT

## 2022-11-09 PROCEDURE — 97530 THERAPEUTIC ACTIVITIES: CPT

## 2022-11-09 PROCEDURE — 97112 NEUROMUSCULAR REEDUCATION: CPT

## 2022-11-09 NOTE — PROGRESS NOTES
PT DAILY TREATMENT NOTE     Patient Name: Nir Rodriguez  Date:2022  : 1940  [x]  Patient  Verified  Payor: Uziel Nones / Plan: [place] MEDICARE PART A & B / Product Type: Medicare /    In time:958  Out time:1056  Total Treatment Time (min): 58  Visit #: 19 of 24-36    Medicare/BCBS Only   Total Timed Codes (min):  48 1:1 Treatment Time:  43       Treatment Area: Right shoulder pain [M25.511]  Cervicalgia [M54.2]    SUBJECTIVE  Pain Level (0-10 scale): 1-2/10  Any medication changes, allergies to medications, adverse drug reactions, diagnosis change, or new procedure performed?: [x] No    [] Yes (see summary sheet for update)  Subjective functional status/changes:   [] No changes reported  I'm still feeling pretty good so far today.      OBJECTIVE    Modality rationale: decrease pain and increase tissue extensibility to improve the patients ability to reduce soreness with daily tasks    Min Type Additional Details    [] Estim:  []Unatt       []IFC  []Premod                        []Other:  []w/ice   []w/heat  Position:  Location:    [] Estim: []Att    []TENS instruct  []NMES                    []Other:  []w/US   []w/ice   []w/heat  Position:  Location:    []  Traction: [] Cervical       []Lumbar                       [] Prone          []Supine                       []Intermittent   []Continuous Lbs:  [] before manual  [] after manual    []  Ultrasound: []Continuous   [] Pulsed                           []1MHz   []3MHz W/cm2:  Location:    []  Iontophoresis with dexamethasone         Location: [] Take home patch   [] In clinic   10 []  Ice     [x]  heat  []  Ice massage  []  Laser   []  Anodyne Position:seated  Location:Right neck/shoulder    []  Laser with stim  []  Other:  Position:  Location:    []  Vasopneumatic Device    []  Right     []  Left  Pre-treatment girth:  Post-treatment girth:  Measured at (location):  Pressure:       [] lo [] med [] hi   Temperature: [] lo [] med [] hi   [x] Skin assessment post-treatment:  [x]intact []redness- no adverse reaction    []redness - adverse reaction:     Vasopnuematic compression justification:  Per bilateral girth measures taken and listed above the edema is considered significant and having an impact on the patient's strength and gait/posture       10 min Therapeutic Exercise:  [x] See flow sheet :  Doorway stretch  UBE  Standing arm circles   Rationale: increase ROM and increase strength to improve the patients ability to perform ADLs     12 min Therapeutic Activity:  [x]  See flow sheet :  Rows, extensions with band  Wall push up +  Full cans with 2# dumbbell - hold  IR/ER walk outs    Rationale: increase ROM, increase strength, and improve coordination  to improve the patients ability to perform reaching and lifting tasks with greater ease     12 min Neuromuscular Re-education:  [x]  See flow sheet :  Standing march with shoulder extension  High plank alternating shoulder taps   Wall clocks  Forward walk out   Rationale: increase strength, improve coordination, improve balance, and increase proprioception  to improve the patients ability to improve postural awareness and stability with functional tasks to reduce compensation and strain      14 min Manual Therapy:  seated STM to Right UT/levator scapulae/rhomboids/cervical paraspinals    The manual therapy interventions were performed at a separate and distinct time from the therapeutic activities interventions.   Rationale: decrease pain, increase ROM, increase tissue extensibility, and decrease trigger points to improve ease of turning head while driving                    With   [] TE   [] TA   [] neuro   [] other: Patient Education: [x] Review HEP    [] Progressed/Changed HEP based on:   [] positioning   [] body mechanics   [] transfers   [] heat/ice application    [] other:      Other Objective/Functional Measures: added core stability activities  Improving stability with increasing repetitions of standing marches with shoulder extension but Supervision assist required  Verbal cueing for  posture with forward walk aways as Patient has tendency to look down    Pain Level (0-10 scale) post treatment: 1/10    ASSESSMENT/Changes in Function: Patient able to progress workout routine this morning to focus more on core engagement and overall postural stability. She notes decreased pain levels the past couple of days, but states that posterior scapular pain is her worst pain typically. Reassess tolerance to updated program next visit. Patient will continue to benefit from skilled PT services to modify and progress therapeutic interventions, address functional mobility deficits, address ROM deficits, address strength deficits, analyze and address soft tissue restrictions, analyze and cue movement patterns, analyze and modify body mechanics/ergonomics, assess and modify postural abnormalities, address imbalance/dizziness, and instruct in home and community integration to attain remaining goals. []  See Plan of Care  []  See progress note/recertification  []  See Discharge Summary         Progress towards goals / Updated goals:  Patient will improve FOTO assessment score to 59 pts in order to indicate improved functional abilities. Status at last note/certification: 48 pts  2. Patient will improve cervical Left rotation AROM by at least 10 deg in order to improve ease of turning head while driving and performing daily tasks. Status at last note/certification: 58 deg  3. Patient will improve Right shoulder external rotation strength to 5/5 in order to improve ease of functional household tasks.    Status at last note/certification: 4/5    PLAN  [x]  Upgrade activities as tolerated     [x]  Continue plan of care  [x]  Update interventions per flow sheet       []  Discharge due to:_  []  Other:_      Buck Rizzo, PT 11/9/2022  9:58 AM    Future Appointments   Date Time Provider Graciela Lopez   11/9/2022 10:00 AM Shirley Resendez, PT [facility]   11/14/2022 10:00 AM Shirley Resendez, PT [facility]   11/16/2022 10:00 AM Shirley Resendez, PT [facility]   11/21/2022 10:00 AM Shirley Resendez, PT [facility]   11/28/2022 10:00 AM Shirley Resendez, PT [facility]   12/1/2022  2:45 PM Abdon Bowlesin [facility]   12/5/2022 10:00 AM Shirley Resendez, PT [facility]   12/8/2022 10:00 AM Clementina Rodriguez, PT [facility]

## 2022-11-14 ENCOUNTER — HOSPITAL ENCOUNTER (OUTPATIENT)
Dept: PHYSICAL THERAPY | Age: 82
Discharge: HOME | End: 2022-11-14
Payer: MEDICARE

## 2022-11-14 PROCEDURE — 97140 MANUAL THERAPY 1/> REGIONS: CPT

## 2022-11-14 PROCEDURE — 97530 THERAPEUTIC ACTIVITIES: CPT

## 2022-11-14 PROCEDURE — 97112 NEUROMUSCULAR REEDUCATION: CPT

## 2022-11-14 NOTE — PROGRESS NOTES
PT DAILY TREATMENT NOTE     Patient Name: Dalia Morgan  Date:2022  : 1940  [x]  Patient  Verified  Payor: Alexander Burnette / Plan: [place] MEDICARE PART A & B / Product Type: Medicare /    In time:1000  Out time:1100  Total Treatment Time (min): 60  Visit #: 20 of     Medicare/BCBS Only   Total Timed Codes (min):  50 1:1 Treatment Time:  45       Treatment Area: Right shoulder pain [M25.511]  Cervicalgia [M54.2]    SUBJECTIVE  Pain Level (0-10 scale): 5/10  Any medication changes, allergies to medications, adverse drug reactions, diagnosis change, or new procedure performed?: [x] No    [] Yes (see summary sheet for update)  Subjective functional status/changes:   [] No changes reported  Sitting in [employer] yesterday my neck really started to bother me on this Right side. I don't know what I did.      OBJECTIVE    Modality rationale: decrease pain and increase tissue extensibility to improve the patients ability to reduce soreness with daily tasks    Min Type Additional Details    [] Estim:  []Unatt       []IFC  []Premod                        []Other:  []w/ice   []w/heat  Position:  Location:    [] Estim: []Att    []TENS instruct  []NMES                    []Other:  []w/US   []w/ice   []w/heat  Position:  Location:    []  Traction: [] Cervical       []Lumbar                       [] Prone          []Supine                       []Intermittent   []Continuous Lbs:  [] before manual  [] after manual    []  Ultrasound: []Continuous   [] Pulsed                           []1MHz   []3MHz W/cm2:  Location:    []  Iontophoresis with dexamethasone         Location: [] Take home patch   [] In clinic   10 []  Ice     [x]  heat  []  Ice massage  []  Laser   []  Anodyne Position:seated  Location:Right neck/shoulder     []  Laser with stim  []  Other:  Position:  Location:    []  Vasopneumatic Device    []  Right     []  Left  Pre-treatment girth:  Post-treatment girth:  Measured at (location):  Pressure: [] lo [] med [] hi   Temperature: [] lo [] med [] hi   [x] Skin assessment post-treatment:  [x]intact []redness- no adverse reaction    []redness - adverse reaction:     Vasopnuematic compression justification:  Per bilateral girth measures taken and listed above the edema is considered significant and having an impact on the patient's strength and gait/posture     10 min Therapeutic Exercise:  [x] See flow sheet :  Doorway stretch  UBE  Standing arm circles   Rationale: increase ROM and increase strength to improve the patients ability to perform ADLs     12 min Therapeutic Activity:  [x]  See flow sheet :  Rows, extensions with band  Wall push up +- hold  Full cans with 2# dumbbell - hold  IR/ER walk outs    Rationale: increase ROM, increase strength, and improve coordination  to improve the patients ability to perform reaching and lifting tasks with greater ease     14 min Neuromuscular Re-education:  [x]  See flow sheet :  Standing march with shoulder extension  High plank alternating shoulder taps   Wall clocks  Forward walk out   Rationale: increase strength, improve coordination, improve balance, and increase proprioception  to improve the patients ability to improve postural awareness and stability with functional tasks to reduce compensation and strain      14 min Manual Therapy:  seated STM to Right UT/levator scapulae/rhomboids/cervical paraspinals    The manual therapy interventions were performed at a separate and distinct time from the therapeutic activities interventions.   Rationale: decrease pain, increase ROM, increase tissue extensibility, and decrease trigger points to improve ease of turning head while driving                            With   [] TE   [] TA   [] neuro   [] other: Patient Education: [x] Review HEP    [] Progressed/Changed HEP based on:   [] positioning   [] body mechanics   [] transfers   [] heat/ice application    [] other:      Other Objective/Functional Measures: updated HEP   No change in Right shoulder external rotation strength, remains 4/5 (increased pain today)    Pain Level (0-10 scale) post treatment: 2/10    ASSESSMENT/Changes in Function: Patient reports increased strain/soreness in her Right neck/shoulder complex yesterday while sitting in [employer]. She states that she was facing forward, but did have to look up. She is compliant with HEP and reports that stretching helps; updated HEP today. Patient will continue to benefit from skilled PT services to modify and progress therapeutic interventions, address functional mobility deficits, address ROM deficits, address strength deficits, analyze and address soft tissue restrictions, analyze and cue movement patterns, analyze and modify body mechanics/ergonomics, assess and modify postural abnormalities, and instruct in home and community integration to attain remaining goals. []  See Plan of Care  []  See progress note/recertification  []  See Discharge Summary         Progress towards goals / Updated goals:  Patient will improve FOTO assessment score to 59 pts in order to indicate improved functional abilities. Status at last note/certification: 48 pts  Current status:   2. Patient will improve cervical Left rotation AROM by at least 10 deg in order to improve ease of turning head while driving and performing daily tasks. Status at last note/certification: 58 deg  Current status:   3. Patient will improve Right shoulder external rotation strength to 5/5 in order to improve ease of functional household tasks.    Status at last note/certification: 4/5  Current status: not met, 4/5 (painful today) 11/14/22    PLAN  [x]  Upgrade activities as tolerated     [x]  Continue plan of care  []  Update interventions per flow sheet       []  Discharge due to:_  []  Other:_      Alba Mcleod PT 11/14/2022  10:12 AM    Future Appointments   Date Time Provider Graciela Lopez   11/16/2022 10:00 AM Iggy Mendosa, PT [facility] 11/21/2022 10:00 AM Pennie Hendrix PT [facility]   11/28/2022 10:00 AM Pennie Hendrix PT [facility]   12/1/2022  2:45 PM Becca Lee [facility]   12/5/2022 10:00 AM Pennie Hendrix PT [facility]   12/8/2022 10:00 AM Darin Li PT [facility]

## 2022-11-16 ENCOUNTER — HOSPITAL ENCOUNTER (OUTPATIENT)
Dept: PHYSICAL THERAPY | Age: 82
Discharge: HOME | End: 2022-11-16
Payer: MEDICARE

## 2022-11-16 PROCEDURE — 97530 THERAPEUTIC ACTIVITIES: CPT

## 2022-11-16 PROCEDURE — 97140 MANUAL THERAPY 1/> REGIONS: CPT

## 2022-11-16 PROCEDURE — 97112 NEUROMUSCULAR REEDUCATION: CPT

## 2022-11-16 NOTE — PROGRESS NOTES
PT DAILY TREATMENT NOTE     Patient Name: Austin Waldrop  Date:2022  : 1940  [x]  Patient  Verified  Payor: [place] MED[place]ARE / Plan: [place] MEDICARE PART A & B / Product Type: Medicare /    In time:1000  Out time:1058  Total Treatment Time (min): 58  Visit #: 21 of -    Medicare/BCBS Only   Total Timed Codes (min):  48 1:1 Treatment Time:  43       Treatment Area: Right shoulder pain [M25.511]  Cervicalgia [M54.2]    SUBJECTIVE  Pain Level (0-10 scale): 10  Any medication changes, allergies to medications, adverse drug reactions, diagnosis change, or new procedure performed?: [x] No    [] Yes (see summary sheet for update)  Subjective functional status/changes:   [] No changes reported  It's still hurting, I don't know what's going on. I don't know if I need an injection. I go back to see them next week.      OBJECTIVE    Modality rationale: decrease pain and increase tissue extensibility to improve the patients ability to reduce soreness with daily tasks, improve cervical mobility    Min Type Additional Details    [] Estim:  []Unatt       []IFC  []Premod                        []Other:  []w/ice   []w/heat  Position:  Location:    [] Estim: []Att    []TENS instruct  []NMES                    []Other:  []w/US   []w/ice   []w/heat  Position:  Location:    []  Traction: [] Cervical       []Lumbar                       [] Prone          []Supine                       []Intermittent   []Continuous Lbs:  [] before manual  [] after manual    []  Ultrasound: []Continuous   [] Pulsed                           []1MHz   []3MHz W/cm2:  Location:    []  Iontophoresis with dexamethasone         Location: [] Take home patch   [] In clinic   10 []  Ice     [x]  heat  []  Ice massage  []  Laser   []  Anodyne Position:seated  Location:Right neck/shoulder    []  Laser with stim  []  Other:  Position:  Location:    []  Vasopneumatic Device    []  Right     []  Left  Pre-treatment girth:  Post-treatment girth: Measured at (location):  Pressure:       [] lo [] med [] hi   Temperature: [] lo [] med [] hi   [x] Skin assessment post-treatment:  [x]intact []redness- no adverse reaction    []redness - adverse reaction:     Vasopnuematic compression justification:  Per bilateral girth measures taken and listed above the edema is considered significant and having an impact on the patient's strength and gait/posture       10 min Therapeutic Exercise:  [x] See flow sheet :  Doorway stretch  UBE  Standing arm circles   Rationale: increase ROM and increase strength to improve the patients ability to perform ADLs     8 min Therapeutic Activity:  [x]  See flow sheet :  Rows, extensions with band  IR/ER walk outs    Rationale: increase ROM, increase strength, and improve coordination  to improve the patients ability to perform reaching and lifting tasks with greater ease     15 min Neuromuscular Re-education:  [x]  See flow sheet :  Standing march with shoulder extension  High plank alternating shoulder taps   Wall clocks  Forward walk out   Rationale: increase strength, improve coordination, improve balance, and increase proprioception  to improve the patients ability to improve postural awareness and stability with functional tasks to reduce compensation and strain      15 min Manual Therapy:  seated STM to Right UT/levator scapulae/rhomboids/cervical paraspinals    The manual therapy interventions were performed at a separate and distinct time from the therapeutic activities interventions.   Rationale: decrease pain, increase ROM, increase tissue extensibility, and decrease trigger points to improve ease of turning head while driving                                         With   [] TE   [] TA   [] neuro   [] other: Patient Education: [x] Review HEP    [] Progressed/Changed HEP based on:   [] positioning   [] body mechanics   [] transfers   [] heat/ice application    [] other:      Other Objective/Functional Measures: cervical AROM after manual: Left rotation 45 deg   Provided tactile assistance to back of head for seated cervical retractions  Patient demonstrates a few instances of LOB with standing marches with extensions     Pain Level (0-10 scale) post treatment: 1/10    ASSESSMENT/Changes in Function: Patient reports less pain following therapy sessions, but continues to have increased Right sided neck/shoulder pain throughout the week. Patient is following up with MD/PA next week, and is interested in asking about recommendation for an injection to manage pain. Objectively she has been able to progress strengthening activities with good tolerance, but today demonstrates increased restrictions in ROM. Our biggest goal will be educating on progression of HEP in preparation for discharge, likely at the end of remaining scheduled visits. Patient will continue to benefit from skilled PT services to modify and progress therapeutic interventions, address functional mobility deficits, address ROM deficits, address strength deficits, analyze and address soft tissue restrictions, analyze and cue movement patterns, analyze and modify body mechanics/ergonomics, assess and modify postural abnormalities, address imbalance/dizziness, and instruct in home and community integration to attain remaining goals. []  See Plan of Care  []  See progress note/recertification  []  See Discharge Summary         Progress towards goals / Updated goals:  Patient will improve FOTO assessment score to 59 pts in order to indicate improved functional abilities. Status at last note/certification: 48 pts  Current status:   2. Patient will improve cervical Left rotation AROM by at least 10 deg in order to improve ease of turning head while driving and performing daily tasks. Status at last note/certification: 58 deg  Current status: not met, 45 deg 11/16/22  3.  Patient will improve Right shoulder external rotation strength to 5/5 in order to improve ease of functional household tasks.    Status at last note/certification: 4/5  Current status: not met, 4/5 (painful today) 11/14/22    PLAN  [x]  Upgrade activities as tolerated     [x]  Continue plan of care  [x]  Update interventions per flow sheet       []  Discharge due to:_  []  Other:_      Nabeel Salazar, PT 11/16/2022  9:42 AM    Future Appointments   Date Time Provider Graciela Lopez   11/16/2022 10:00 AM Christos Garcia, PT [facility]   11/21/2022 10:00 AM Christos Garcia, PT [facility]   11/28/2022 10:00 AM Christos Garcia, PT [facility]   12/1/2022  2:45 PM Lazaro Salon [facility]   12/5/2022 10:00 AM Christos Garcia, PT [facility]   12/8/2022 10:00 AM Beny Arboleda, PT [facility]

## 2022-11-21 ENCOUNTER — HOSPITAL ENCOUNTER (OUTPATIENT)
Dept: PHYSICAL THERAPY | Age: 82
Discharge: HOME | End: 2022-11-21
Payer: MEDICARE

## 2022-11-21 PROCEDURE — 97530 THERAPEUTIC ACTIVITIES: CPT

## 2022-11-21 PROCEDURE — 97110 THERAPEUTIC EXERCISES: CPT

## 2022-11-21 PROCEDURE — 97140 MANUAL THERAPY 1/> REGIONS: CPT

## 2022-11-21 NOTE — PROGRESS NOTES
PT DAILY TREATMENT NOTE     Patient Name: Sheba Guido  Date:2022  : 1940  [x]  Patient  Verified  Payor: [place] MEDICARE / Plan: [place] MEDICARE PART A & B / Product Type: Medicare /    In time:  Out time:1057  Total Treatment Time (min): 50  Visit #: 22 of     Medicare/BCBS Only   Total Timed Codes (min):  40 1:1 Treatment Time:  40       Treatment Area: Right shoulder pain [M25.511]  Cervicalgia [M54.2]    SUBJECTIVE  Pain Level (0-10 scale): 0/10 neck/Right shoulder (5/10 back pain)  Any medication changes, allergies to medications, adverse drug reactions, diagnosis change, or new procedure performed?: [x] No    [] Yes (see summary sheet for update)  Subjective functional status/changes:   [] No changes reported  Monday after the exercises here I felt some soreness in my back and then before I got home Wednesday I was really hurting down there. There's been times where it took me five minutes to get up. The right side is better now but the left side is still hurting me. I've been surprised though the neck has been fine.      OBJECTIVE    Modality rationale: decrease pain and increase tissue extensibility to improve the patients ability to reduce soreness with daily tasks    Min Type Additional Details    [] Estim:  []Unatt       []IFC  []Premod                        []Other:  []w/ice   []w/heat  Position:  Location:    [] Estim: []Att    []TENS instruct  []NMES                    []Other:  []w/US   []w/ice   []w/heat  Position:  Location:    []  Traction: [] Cervical       []Lumbar                       [] Prone          []Supine                       []Intermittent   []Continuous Lbs:  [] before manual  [] after manual    []  Ultrasound: []Continuous   [] Pulsed                           []1MHz   []3MHz W/cm2:  Location:    []  Iontophoresis with dexamethasone         Location: [] Take home patch   [] In clinic   10 []  Ice     [x]  heat  []  Ice massage  []  Laser   []  Anodyne Position:seated  Location:Right neck/shoulder (also low back)    []  Laser with stim  []  Other:  Position:  Location:    []  Vasopneumatic Device    []  Right     []  Left  Pre-treatment girth:  Post-treatment girth:  Measured at (location):  Pressure:       [] lo [] med [] hi   Temperature: [] lo [] med [] hi   [x] Skin assessment post-treatment:  [x]intact []redness- no adverse reaction    []redness - adverse reaction:     Vasopnuematic compression justification:  Per bilateral girth measures taken and listed above the edema is considered significant and having an impact on the patient's strength and gait/posture       15 min Therapeutic Exercise:  [x] See flow sheet :  Doorway stretch- hold  UBE- hold  seated arm circles  Seated cervical retractions   Assessment    Rationale: increase ROM and increase strength to improve the patients ability to perform ADLs     10 min Therapeutic Activity:  [x]  See flow sheet :  Seated full cans  Seated scap stabs   Rationale: increase ROM, increase strength, and improve coordination  to improve the patients ability to perform reaching and lifting tasks with greater ease     hold min Neuromuscular Re-education:  [x]  See flow sheet :  Standing march with shoulder extension  High plank alternating shoulder taps   Wall clocks  Forward walk out   Rationale: increase strength, improve coordination, improve balance, and increase proprioception  to improve the patients ability to improve postural awareness and stability with functional tasks to reduce compensation and strain      15 min Manual Therapy:  seated STM to Right UT/levator scapulae/rhomboids/cervical paraspinals    The manual therapy interventions were performed at a separate and distinct time from the therapeutic activities interventions.   Rationale: decrease pain, increase ROM, increase tissue extensibility, and decrease trigger points to improve ease of turning head while driving                            With   [] TE   [] TA   [] neuro   [] other: Patient Education: [x] Review HEP    [] Progressed/Changed HEP based on:   [] positioning   [] body mechanics   [] transfers   [] heat/ice application    [] other:      Other Objective/Functional Measures: performed seated activities today due to increased low back pain   Cueing for technique with seated cervical retractions  Some increase in Left sided back pain with seated scap stabs     Pain Level (0-10 scale) post treatment: 0/10 neck/shoulder    ASSESSMENT/Changes in Function: Patients note that over the past several days she has noticed much reduced neck/Right shoulder pain. However, since previous PT visit she has had increased low back pain. Currently pain is worse on the Left than Right, and is worse with getting up and down and ambulation. She reports that pain does not disrupt sleep, but when she wakes up she does have pain. Adjusted program today to support low back with UE activities. Patient is following up with MD/PA tomorrow and will mention new onset low back pain. Continue to adjust as needed. Patient will continue to benefit from skilled PT services to modify and progress therapeutic interventions, address functional mobility deficits, address ROM deficits, address strength deficits, analyze and address soft tissue restrictions, analyze and cue movement patterns, analyze and modify body mechanics/ergonomics, assess and modify postural abnormalities, address imbalance/dizziness, and instruct in home and community integration to attain remaining goals. []  See Plan of Care  []  See progress note/recertification  []  See Discharge Summary         Progress towards goals / Updated goals:  Patient will improve FOTO assessment score to 59 pts in order to indicate improved functional abilities. Status at last note/certification: 48 pts  Current status:   2.  Patient will improve cervical Left rotation AROM by at least 10 deg in order to improve ease of turning head while driving and performing daily tasks. Status at last note/certification: 58 deg  Current status: not met, 45 deg 11/16/22  3. Patient will improve Right shoulder external rotation strength to 5/5 in order to improve ease of functional household tasks.    Status at last note/certification: 4/5  Current status: not met, 4/5 (painful today) 11/14/22    PLAN  [x]  Upgrade activities as tolerated     [x]  Continue plan of care  []  Update interventions per flow sheet       []  Discharge due to:_  []  Other:_      Nitza Hunt, PT 11/21/2022  9:50 AM    Future Appointments   Date Time Provider Graciela Lopez   11/21/2022 10:00 AM Jake Colón, PT [facility]   11/28/2022 10:00 AM Jake Colón, PT [facility]   12/1/2022  2:45 PM Nacho Mesa [facility]   12/5/2022 10:00 AM Jake Colón, PT [facility]   12/8/2022 10:00 AM Selin Hendrix, PT [facility]

## 2022-11-28 ENCOUNTER — HOSPITAL ENCOUNTER (OUTPATIENT)
Dept: PHYSICAL THERAPY | Age: 82
Discharge: HOME | End: 2022-11-28
Payer: MEDICARE

## 2022-11-28 PROCEDURE — 97112 NEUROMUSCULAR REEDUCATION: CPT

## 2022-11-28 PROCEDURE — 97140 MANUAL THERAPY 1/> REGIONS: CPT

## 2022-11-28 PROCEDURE — 97530 THERAPEUTIC ACTIVITIES: CPT

## 2022-11-28 NOTE — PROGRESS NOTES
PT DAILY TREATMENT NOTE     Patient Name: Negin Vanegas  Date:2022  : 1940  [x]  Patient  Verified  Payor: [place] MEDICARE / Plan: [place] MEDICARE PART A & B / Product Type: Medicare /    In time:  Out time:1056  Total Treatment Time (min): 54  Visit #: 23 of     Medicare/BCBS Only   Total Timed Codes (min):  44 1:1 Treatment Time:  44       Treatment Area: Right shoulder pain [M25.511]  Cervicalgia [M54.2]    SUBJECTIVE  Pain Level (0-10 scale): 1-2/10  Any medication changes, allergies to medications, adverse drug reactions, diagnosis change, or new procedure performed?: [x] No    [] Yes (see summary sheet for update)  Subjective functional status/changes:   [] No changes reported  I'm feeling so much better in my back. I think maybe it was a pull that caused it but it's better. There's still a twinge in my neck.      OBJECTIVE    Modality rationale: decrease pain and increase tissue extensibility to improve the patients ability to reduce soreness with daily tasks    Min Type Additional Details    [] Estim:  []Unatt       []IFC  []Premod                        []Other:  []w/ice   []w/heat  Position:  Location:    [] Estim: []Att    []TENS instruct  []NMES                    []Other:  []w/US   []w/ice   []w/heat  Position:  Location:    []  Traction: [] Cervical       []Lumbar                       [] Prone          []Supine                       []Intermittent   []Continuous Lbs:  [] before manual  [] after manual    []  Ultrasound: []Continuous   [] Pulsed                           []1MHz   []3MHz W/cm2:  Location:    []  Iontophoresis with dexamethasone         Location: [] Take home patch   [] In clinic    []  Ice     []  heat  []  Ice massage  []  Laser   []  Anodyne Position:  Location:    []  Laser with stim  []  Other:  Position:  Location:    []  Vasopneumatic Device    []  Right     []  Left  Pre-treatment girth:  Post-treatment girth:  Measured at (location):  Pressure:       [] lo [] med [] hi   Temperature: [] lo [] med [] hi   [] Skin assessment post-treatment:  []intact []redness- no adverse reaction    []redness - adverse reaction:     Vasopnuematic compression justification:  Per bilateral girth measures taken and listed above the edema is considered significant and having an impact on the patient's strength and gait/posture       10 min Therapeutic Exercise:  [x] See flow sheet :  Doorway stretch  UBE- hold  Standing arm circles  Seated cervical retractions      Rationale: increase ROM and increase strength to improve the patients ability to perform ADLs     10 min Therapeutic Activity:  [x]  See flow sheet :  standing full cans  Seated scap stabs   Rationale: increase ROM, increase strength, and improve coordination  to improve the patients ability to perform reaching and lifting tasks with greater ease     10 min Neuromuscular Re-education:  [x]  See flow sheet :  Standing march with shoulder extension- hold  High plank alternating shoulder taps   Wall clocks  Forward walk out- hold   Rationale: increase strength, improve coordination, improve balance, and increase proprioception  to improve the patients ability to improve postural awareness and stability with functional tasks to reduce compensation and strain      14 min Manual Therapy:  seated STM to Right UT/levator scapulae/rhomboids/cervical paraspinals    The manual therapy interventions were performed at a separate and distinct time from the therapeutic activities interventions.   Rationale: decrease pain, increase ROM, increase tissue extensibility, and decrease trigger points to improve ease of turning head while driving                            With   [] TE   [] TA   [] neuro   [] other: Patient Education: [x] Review HEP    [] Progressed/Changed HEP based on:   [] positioning   [] body mechanics   [] transfers   [] heat/ice application    [] other:      Other Objective/Functional Measures: seated Right shoulder external rotation strength 5/5, slight twinge  Resumed standing activities due to limited low back pain but continued to hold on standing shoulder extension with march to avoid increased low back pain; recommended Patient try gentle alternating marches at home with UE support PRN to work on balance training     Pain Level (0-10 scale) post treatment: 0/10    ASSESSMENT/Changes in Function: Patient reports good reduction in low back pain over the past few days; MD/PA included adding low back pain to treatment for PT but currently Patient declines as she is feeling much better. She notes that she continues to have twinges of pain in her Right shoulder/neck, and during manual interventions her biggest pain complaint (reproducing her original pain) is in the region of the rhomboids/medial scapular border. Patient will continue to benefit from skilled PT services to modify and progress therapeutic interventions, address functional mobility deficits, address ROM deficits, address strength deficits, analyze and address soft tissue restrictions, analyze and cue movement patterns, analyze and modify body mechanics/ergonomics, assess and modify postural abnormalities, address imbalance/dizziness, and instruct in home and community integration to attain remaining goals. []  See Plan of Care  []  See progress note/recertification  []  See Discharge Summary         Progress towards goals / Updated goals:  Patient will improve FOTO assessment score to 59 pts in order to indicate improved functional abilities. Status at last note/certification: 48 pts  Current status:   2. Patient will improve cervical Left rotation AROM by at least 10 deg in order to improve ease of turning head while driving and performing daily tasks. Status at last note/certification: 58 deg  Current status: not met, 45 deg 11/16/22  3.  Patient will improve Right shoulder external rotation strength to 5/5 in order to improve ease of functional household tasks.    Status at last note/certification: 4/5  Current status: met, 5/5 today (slight twinge of pain) 11/28/22    PLAN  [x]  Upgrade activities as tolerated     [x]  Continue plan of care  []  Update interventions per flow sheet       []  Discharge due to:_  []  Other:_      Daniel Tatum, PT 11/28/2022  9:28 AM    Future Appointments   Date Time Provider Graciela Lopez   11/28/2022 10:00 AM Adriana Sales, PT [facility]   12/1/2022  2:45 PM Claudean Oscar [facility]   12/5/2022 10:00 AM Adriana Sales, PT [facility]   12/8/2022 10:00 AM Kiko Lundberg PT [facility]

## 2022-12-01 ENCOUNTER — HOSPITAL ENCOUNTER (OUTPATIENT)
Dept: PHYSICAL THERAPY | Age: 82
Discharge: HOME | End: 2022-12-01
Payer: MEDICARE

## 2022-12-01 PROCEDURE — 97112 NEUROMUSCULAR REEDUCATION: CPT

## 2022-12-01 PROCEDURE — 97110 THERAPEUTIC EXERCISES: CPT

## 2022-12-01 PROCEDURE — 97530 THERAPEUTIC ACTIVITIES: CPT

## 2022-12-01 NOTE — PROGRESS NOTES
PT DAILY TREATMENT NOTE     Patient Name: Camilo Deal  Date:2022  : 1940  [x]  Patient  Verified  Payor: Bishop Khanna / Plan: [place] MED[place] & B / Product Type: Medicare /    In time:247  Out time: 346  Total Treatment Time (min): 59  Visit #: 24 of -36    Medicare/BCBS Only   Total Timed Codes (min):  49 1:1 Treatment Time:  44       Treatment Area: Right shoulder pain [M25.511]  Cervicalgia [M54.2]    SUBJECTIVE  Pain Level (0-10 scale): 2-310  Any medication changes, allergies to medications, adverse drug reactions, diagnosis change, or new procedure performed?: [x] No    [] Yes (see summary sheet for update)  Subjective functional status/changes:   [] No changes reported  Patient notes her pain is worse in the shoulder due to stress.     OBJECTIVE    Modality rationale: decrease pain and increase tissue extensibility to improve the patients ability to reduce soreness with daily tasks    Min Type Additional Details    [] Estim:  []Unatt       []IFC  []Premod                        []Other:  []w/ice   []w/heat  Position:  Location:    [] Estim: []Att    []TENS instruct  []NMES                    []Other:  []w/US   []w/ice   []w/heat  Position:  Location:    []  Traction: [] Cervical       []Lumbar                       [] Prone          []Supine                       []Intermittent   []Continuous Lbs:  [] before manual  [] after manual    []  Ultrasound: []Continuous   [] Pulsed                           []1MHz   []3MHz W/cm2:  Location:    []  Iontophoresis with dexamethasone         Location: [] Take home patch   [] In clinic   10' []  Ice     [x]  heat  []  Ice massage  []  Laser   []  Anodyne Position: sitting  Location: R shoulder    []  Laser with stim  []  Other:  Position:  Location:    []  Vasopneumatic Device    []  Right     []  Left  Pre-treatment girth:  Post-treatment girth:  Measured at (location):  Pressure:       [] lo [] med [] hi   Temperature: [] lo [] med [] hi [] Skin assessment post-treatment:  []intact []redness- no adverse reaction    []redness - adverse reaction:     Vasopnuematic compression justification:  Per bilateral girth measures taken and listed above the edema is considered significant and having an impact on the patient's strength and gait/posture       10 min Therapeutic Exercise:  [x] See flow sheet :  Doorway stretch  UBE- hold  Standing arm circles  Seated cervical retractions      Rationale: increase ROM and increase strength to improve the patients ability to perform ADLs     10 min Therapeutic Activity:  [x]  See flow sheet :  standing full cans  Seated scap stabs  Wall slides   Rationale: increase ROM, increase strength, and improve coordination  to improve the patients ability to perform reaching and lifting tasks with greater ease     19 min Neuromuscular Re-education:  [x]  See flow sheet :  Standing march with shoulder extension- hold  High plank alternating shoulder taps   Wall clocks  Forward walk out   Rationale: increase strength, improve coordination, improve balance, and increase proprioception  to improve the patients ability to improve postural awareness and stability with functional tasks to reduce compensation and strain      10 min Manual Therapy:  seated STM to Right UT/levator scapulae/rhomboids/cervical paraspinals    The manual therapy interventions were performed at a separate and distinct time from the therapeutic activities interventions.   Rationale: decrease pain, increase ROM, increase tissue extensibility, and decrease trigger points to improve ease of turning head while driving                            With   [] TE   [] TA   [] neuro   [] other: Patient Education: [x] Review HEP    [] Progressed/Changed HEP based on:   [] positioning   [] body mechanics   [] transfers   [] heat/ice application    [] other:      Other Objective/Functional Measures: Pain Level (0-10 scale)   Noted tightness in UT/LS during manual intervention  Continued wall slides   Increased reps with seated full cans and planks with shoulder taps   post treatment: 0/10    ASSESSMENT/Changes in Function:   Patient tolerated exercise well, notes slight fatigue in the shoulders following exercises. Noted increased tightness in the UT area, states she has been doing lots of errands today and is stressed. Verbal cuing for posture during standing exercises. No complaints of pain at end of session, notes decreased tightness following manual intervention. Continue to progress as tolerated. Patient will continue to benefit from skilled PT services to modify and progress therapeutic interventions, address functional mobility deficits, address ROM deficits, address strength deficits, analyze and address soft tissue restrictions, analyze and cue movement patterns, analyze and modify body mechanics/ergonomics, assess and modify postural abnormalities, address imbalance/dizziness, and instruct in home and community integration to attain remaining goals. [x]  See Plan of Care  []  See progress note/recertification  []  See Discharge Summary         Progress towards goals / Updated goals:  Patient will improve FOTO assessment score to 59 pts in order to indicate improved functional abilities. Status at last note/certification: 48 pts  Current status:   2. Patient will improve cervical Left rotation AROM by at least 10 deg in order to improve ease of turning head while driving and performing daily tasks. Status at last note/certification: 58 deg  Current status: not met, 45 deg 11/16/22  3. Patient will improve Right shoulder external rotation strength to 5/5 in order to improve ease of functional household tasks.    Status at last note/certification: 4/5  Current status: met, 5/5 today (slight twinge of pain) 11/28/22    PLAN  [x]  Upgrade activities as tolerated     [x]  Continue plan of care  []  Update interventions per flow sheet       []  Discharge due to:_  []  Other:_      Dewayne Geller, PTA 12/1/2022  9:28 AM    Future Appointments   Date Time Provider Graciela Lopez   12/5/2022 10:00 AM Alisson Esquivel, PT [facility]   12/8/2022 10:00 AM Isidro Rodriguez, PT [facility]

## 2022-12-05 ENCOUNTER — HOSPITAL ENCOUNTER (OUTPATIENT)
Dept: PHYSICAL THERAPY | Age: 82
Discharge: HOME | End: 2022-12-05
Payer: MEDICARE

## 2022-12-05 PROCEDURE — 97140 MANUAL THERAPY 1/> REGIONS: CPT

## 2022-12-05 PROCEDURE — 97110 THERAPEUTIC EXERCISES: CPT

## 2022-12-05 NOTE — PROGRESS NOTES
PT DAILY TREATMENT NOTE     Patient Name: Camilo Deal  Date:2022  : 1940  [x]  Patient  Verified  Payor: [place] MEDICARE / Plan: [place] MEDICARE PART A & B / Product Type: Medicare /    In time:1005  Out time:1057  Total Treatment Time (min): 52  Visit #: 25 of -36    Medicare/BCBS Only   Total Timed Codes (min):  42 1:1 Treatment Time:  42       Treatment Area: Right shoulder pain [M25.511]  Cervicalgia [M54.2]    SUBJECTIVE  Pain Level (0-10 scale): 2-3/10  Any medication changes, allergies to medications, adverse drug reactions, diagnosis change, or new procedure performed?: [x] No    [] Yes (see summary sheet for update)  Subjective functional status/changes:   [] No changes reported  I feel it today but some days I just wake up and I feel it. I think a lot of it is that nerve that you found.      OBJECTIVE    Modality rationale: decrease pain and increase tissue extensibility to improve the patients ability to reduce soreness with daily tasks    Min Type Additional Details    [] Estim:  []Unatt       []IFC  []Premod                        []Other:  []w/ice   []w/heat  Position:  Location:    [] Estim: []Att    []TENS instruct  []NMES                    []Other:  []w/US   []w/ice   []w/heat  Position:  Location:    []  Traction: [] Cervical       []Lumbar                       [] Prone          []Supine                       []Intermittent   []Continuous Lbs:  [] before manual  [] after manual    []  Ultrasound: []Continuous   [] Pulsed                           []1MHz   []3MHz W/cm2:  Location:    []  Iontophoresis with dexamethasone         Location: [] Take home patch   [] In clinic   10 []  Ice     [x]  heat  []  Ice massage  []  Laser   []  Anodyne Position:seated  Location:Right neck/shoulder    []  Laser with stim  []  Other:  Position:  Location:    []  Vasopneumatic Device    []  Right     []  Left  Pre-treatment girth:  Post-treatment girth:  Measured at (location):  Pressure: [] lo [] med [] hi   Temperature: [] lo [] med [] hi   [x] Skin assessment post-treatment:  [x]intact []redness- no adverse reaction    []redness - adverse reaction:     Vasopnuematic compression justification:  Per bilateral girth measures taken and listed above the edema is considered significant and having an impact on the patient's strength and gait/posture       19 min Therapeutic Exercise:  [x] See flow sheet :  FOTO/reassessment/goals    TC:  Doorway stretch  UBE- hold  Standing arm circles  Seated cervical retractions       Rationale: increase ROM and increase strength to improve the patients ability to perform ADLs     hold min Therapeutic Activity:  [x]  See flow sheet :  standing full cans  Seated scap stabs  Wall slides   Rationale: increase ROM, increase strength, and improve coordination  to improve the patients ability to perform reaching and lifting tasks with greater ease     hold min Neuromuscular Re-education:  [x]  See flow sheet :  Standing march with shoulder extension- hold  High plank alternating shoulder taps   Wall clocks  Forward walk out   Rationale: increase strength, improve coordination, improve balance, and increase proprioception  to improve the patients ability to improve postural awareness and stability with functional tasks to reduce compensation and strain      23 min Manual Therapy:  seated STM to Right UT/levator scapulae/rhomboids/cervical paraspinals    The manual therapy interventions were performed at a separate and distinct time from the therapeutic activities interventions.   Rationale: decrease pain, increase ROM, increase tissue extensibility, and decrease trigger points to improve ease of turning head while driving                            With   [] TE   [] TA   [] neuro   [] other: Patient Education: [x] Review HEP    [] Progressed/Changed HEP based on:   [] positioning   [] body mechanics   [] transfers   [] heat/ice application    [] other:      Other Objective/Functional Measures: seated Left cervical rotation AROM: 51 deg   Right shoulder ER strength 5/5  Functional Gains: less pain in the neck and Right shoulder, greater ease of daily tasks/household chores, improving ease checking traffic while driving  Functional Deficits: intermittent periods of pain in the neck/Right shoulder, cervical mobility, increased pain with colder weather   % improvement: 80%  Pain   Average: 1-2/10       Best: 0/10     Worst: 2-3/10  Patient Goal: \"to have less pain\"     Pain Level (0-10 scale) post treatment: 1/10    ASSESSMENT/Changes in Function: Patient has consistently attended therapy over the past few months for Right neck and shoulder pain. Over the course of treatment she has reported improving pain levels and subjective improvement in mobility, including to check traffic while driving. Patient does demonstrate improvement in Right shoulder strength, but compared to previous reassessment a regression in cervical mobility, though she notes no difficulty with turning her head other than some muscle pull. Patient remains compliant with HEP, and uses a heating pad for her tightness PRN. Due to good progress toward goals, compliance with HEP, and maximizing gains with therapy at this time, we will discharge to independent management, but would be happy to treat Patient in the future as needed. []  See Plan of Care  []  See progress note/recertification  [x]  See Discharge Summary         Progress towards goals / Updated goals:  Patient will improve FOTO assessment score to 59 pts in order to indicate improved functional abilities. Status at last note/certification: 48 pts  Current status: met, 62 pts 12/5/22  2. Patient will improve cervical Left rotation AROM by at least 10 deg in order to improve ease of turning head while driving and performing daily tasks.    Status at last note/certification: 58 deg  Current status: not met, 51 deg (but improved from 45 deg at prior assessment) 12/5/22  3. Patient will improve Right shoulder external rotation strength to 5/5 in order to improve ease of functional household tasks.    Status at last note/certification: 4/5  Current status: met, 5/5 today (slight twinge of pain) 11/28/22    PLAN  []  Upgrade activities as tolerated     []  Continue plan of care  []  Update interventions per flow sheet       [x]  Discharge due to:_met, progressing toward goals  []  Other:_      Gucci Sheridan, PT 12/5/2022  9:53 AM    Future Appointments   Date Time Provider Graciela Lopez   12/5/2022 10:00 AM Jensen Pham, PT [place] [id number] [facility]   12/8/2022 10:00 AM Caridad Salgado, PT [place] [id number] [facility]

## 2022-12-05 NOTE — PROGRESS NOTES
[facility]  [address]  [phone] fax    Discharge Summary    Patient name: Dalia Morgan Start of Care: 2022   Referral source: Darryl Gonzalez MD : 1940   Medical/Treatment Diagnosis: Right shoulder pain [M25.511]  Cervicalgia [M54.2]  Payor: Alexander Burnette / Plan: [place] MEDICARE PART A & B / Product Type: Medicare /  Onset Date:2022      Prior Hospitalization: see medical history Provider#: [id number]   Medications: Verified on Patient Summary List      Comorbidities: heart disease, osteoporosis, fibromyalgia, hearing impairment, arthritis   Prior Level of Function: Functionally independent, retired, ambulates without AD, lives alone    Visits from [place] of Care: 25    Missed Visits: 0    Reporting Period : 2022 to 2022    seated Left cervical rotation AROM: 51 deg     Right shoulder ER strength 5/5  Functional Gains: less pain in the neck and Right shoulder, greater ease of daily tasks/household chores, improving ease checking traffic while driving  Functional Deficits: intermittent periods of pain in the neck/Right shoulder, cervical mobility, increased pain with colder weather   % improvement: 80%  Pain   Average: 1-2/10                  Best: 0/10                Worst: 2-3/10  Patient Goal: \"to have less pain\"     Patient will improve FOTO assessment score to 59 pts in order to indicate improved functional abilities. Status at last note/certification: 48 pts  Current status: met, 62 pts 22  2. Patient will improve cervical Left rotation AROM by at least 10 deg in order to improve ease of turning head while driving and performing daily tasks. Status at last note/certification: 58 deg  Current status: not met, 51 deg (but improved from 45 deg at prior assessment) 22  3. Patient will improve Right shoulder external rotation strength to 5/5 in order to improve ease of functional household tasks. Status at last note/certification: 4/5  Current status: met, 5/5 today (slight twinge of pain) 11/28/22    Assessment/ Summary of Care: Patient has consistently attended therapy over the past few months for Right neck and shoulder pain. Over the course of treatment she has reported improving pain levels and subjective improvement in mobility, including to check traffic while driving. Patient does demonstrate improvement in Right shoulder strength, but compared to previous reassessment a regression in cervical mobility, though she notes no difficulty with turning her head other than some muscle pull. Patient remains compliant with HEP, and uses a heating pad for her tightness PRN. Due to good progress toward goals, compliance with HEP, and maximizing gains with therapy at this time, we will discharge to independent management, but would be happy to treat Patient in the future as needed.      RECOMMENDATIONS:  [x]Discontinue therapy: [x]Patient has reached or is progressing toward set goals      []Patient is non-compliant or has abdicated      []Due to lack of appreciable progress towards set [address] [place], PT 12/5/2022 10:21 AM

## 2022-12-08 ENCOUNTER — APPOINTMENT (OUTPATIENT)
Dept: PHYSICAL THERAPY | Age: 82
End: 2022-12-08
Payer: MEDICARE

## 2023-01-17 ENCOUNTER — COMPREHENSIVE EXAM (OUTPATIENT)
Dept: URBAN - METROPOLITAN AREA CLINIC 1 | Facility: CLINIC | Age: 83
End: 2023-01-17

## 2023-01-17 DIAGNOSIS — H43.813: ICD-10-CM

## 2023-01-17 DIAGNOSIS — Z96.1: ICD-10-CM

## 2023-01-17 DIAGNOSIS — H04.123: ICD-10-CM

## 2023-01-17 DIAGNOSIS — D31.32: ICD-10-CM

## 2023-01-17 DIAGNOSIS — H01.021: ICD-10-CM

## 2023-01-17 DIAGNOSIS — H01.024: ICD-10-CM

## 2023-01-17 DIAGNOSIS — H16.143: ICD-10-CM

## 2023-01-17 PROCEDURE — 99214 OFFICE O/P EST MOD 30 MIN: CPT

## 2023-01-17 ASSESSMENT — VISUAL ACUITY
OS_CC: J1+
OS_CC: 20/20-1
OD_CC: J1+
OD_CC: 20/20-1

## 2023-01-17 ASSESSMENT — TONOMETRY
OD_IOP_MMHG: 13
OS_IOP_MMHG: 13

## 2023-01-17 NOTE — PATIENT DISCUSSION
Improved with AT compliance. Continue AT's QID OU. Consider Xiidra or Restasis if no improvement. Modified Advancement Flap Text: The defect edges were debeveled with a #15 scalpel blade.  Given the location of the defect, shape of the defect and the proximity to free margins a modified advancement flap was deemed most appropriate.  Using a sterile surgical marker, an appropriate advancement flap was drawn incorporating the defect and placing the expected incisions within the relaxed skin tension lines where possible.    The area thus outlined was incised deep to adipose tissue with a #15 scalpel blade.  The skin margins were undermined to an appropriate distance in all directions utilizing iris scissors.

## 2024-01-22 ENCOUNTER — COMPREHENSIVE EXAM (OUTPATIENT)
Dept: URBAN - METROPOLITAN AREA CLINIC 2 | Facility: CLINIC | Age: 84
End: 2024-01-22

## 2024-01-22 DIAGNOSIS — H04.123: ICD-10-CM

## 2024-01-22 DIAGNOSIS — H01.021: ICD-10-CM

## 2024-01-22 DIAGNOSIS — D31.32: ICD-10-CM

## 2024-01-22 DIAGNOSIS — H43.813: ICD-10-CM

## 2024-01-22 DIAGNOSIS — Z96.1: ICD-10-CM

## 2024-01-22 DIAGNOSIS — H16.143: ICD-10-CM

## 2024-01-22 DIAGNOSIS — H01.024: ICD-10-CM

## 2024-01-22 PROCEDURE — A4263 PERMANENT TEAR DUCT PLUG: HCPCS

## 2024-01-22 PROCEDURE — 92014 COMPRE OPH EXAM EST PT 1/>: CPT

## 2024-01-22 PROCEDURE — 68761 CLOSE TEAR DUCT OPENING: CPT

## 2024-01-22 ASSESSMENT — TONOMETRY
OD_IOP_MMHG: 12
OS_IOP_MMHG: 12

## 2024-01-22 ASSESSMENT — VISUAL ACUITY
OS_CC: 20/20
OD_CC: 20/20